# Patient Record
Sex: FEMALE | Race: BLACK OR AFRICAN AMERICAN | NOT HISPANIC OR LATINO | URBAN - METROPOLITAN AREA
[De-identification: names, ages, dates, MRNs, and addresses within clinical notes are randomized per-mention and may not be internally consistent; named-entity substitution may affect disease eponyms.]

---

## 2018-12-28 ENCOUNTER — EMERGENCY (EMERGENCY)
Facility: HOSPITAL | Age: 18
LOS: 0 days | Discharge: HOME | End: 2018-12-28
Attending: EMERGENCY MEDICINE
Payer: MEDICAID

## 2018-12-28 VITALS
TEMPERATURE: 98 F | SYSTOLIC BLOOD PRESSURE: 137 MMHG | OXYGEN SATURATION: 98 % | RESPIRATION RATE: 17 BRPM | HEART RATE: 73 BPM | WEIGHT: 190.04 LBS | DIASTOLIC BLOOD PRESSURE: 60 MMHG

## 2018-12-28 VITALS
TEMPERATURE: 98 F | HEART RATE: 80 BPM | DIASTOLIC BLOOD PRESSURE: 70 MMHG | RESPIRATION RATE: 18 BRPM | OXYGEN SATURATION: 98 % | SYSTOLIC BLOOD PRESSURE: 128 MMHG

## 2018-12-28 DIAGNOSIS — N76.4 ABSCESS OF VULVA: ICD-10-CM

## 2018-12-28 LAB
APPEARANCE UR: ABNORMAL
BACTERIA # UR AUTO: ABNORMAL
BILIRUB UR-MCNC: NEGATIVE — SIGNIFICANT CHANGE UP
COLOR SPEC: YELLOW — SIGNIFICANT CHANGE UP
DIFF PNL FLD: NEGATIVE — SIGNIFICANT CHANGE UP
EPI CELLS # UR: SIGNIFICANT CHANGE UP
GLUCOSE UR QL: NEGATIVE MG/DL — SIGNIFICANT CHANGE UP
HCG UR QL: NEGATIVE — SIGNIFICANT CHANGE UP
KETONES UR-MCNC: ABNORMAL
LEUKOCYTE ESTERASE UR-ACNC: ABNORMAL
NITRITE UR-MCNC: POSITIVE
PH UR: 7 — SIGNIFICANT CHANGE UP (ref 5–8)
PROT UR-MCNC: 15 MG/DL
RBC CASTS # UR COMP ASSIST: SIGNIFICANT CHANGE UP /HPF (ref 0–4)
SP GR SPEC: 1.01 — SIGNIFICANT CHANGE UP (ref 1.01–1.02)
UROBILINOGEN FLD QL: NEGATIVE MG/DL — SIGNIFICANT CHANGE UP
WBC UR QL: SIGNIFICANT CHANGE UP

## 2018-12-28 PROCEDURE — 56405 I&D VULVA/PERINEAL ABSCESS: CPT

## 2018-12-28 PROCEDURE — 99284 EMERGENCY DEPT VISIT MOD MDM: CPT | Mod: 25

## 2018-12-28 NOTE — ED PROVIDER NOTE - OBJECTIVE STATEMENT
This is  a 19 yo F c/o or right genital abscess s/p shaving x 3 days. Denies n/v/f/d/ vaginal discharge

## 2018-12-28 NOTE — ED PROVIDER NOTE - ATTENDING CONTRIBUTION TO CARE
Patient evaluated and seen with CHIQUIS Anaya agree with above history and physical - pt examined and seen by me personally - findings as seen: Pt with R vaginal lip swelling - abscess noted - drained purulent discharge by CHIQUIS Anaya - placed on Abx and given GYN follow up.

## 2018-12-28 NOTE — ED ADULT NURSE NOTE - OBJECTIVE STATEMENT
a/o x4 c/o 10/10 painful cyst to right vaginal labia x 2days, states its swollen and red with a "head"

## 2018-12-29 LAB
C TRACH RRNA SPEC QL NAA+PROBE: SIGNIFICANT CHANGE UP
N GONORRHOEA RRNA SPEC QL NAA+PROBE: SIGNIFICANT CHANGE UP
SPECIMEN SOURCE: SIGNIFICANT CHANGE UP

## 2018-12-30 LAB
-  AMIKACIN: SIGNIFICANT CHANGE UP
-  AMOXICILLIN/CLAVULANIC ACID: SIGNIFICANT CHANGE UP
-  AMPICILLIN/SULBACTAM: SIGNIFICANT CHANGE UP
-  AMPICILLIN/SULBACTAM: SIGNIFICANT CHANGE UP
-  AMPICILLIN: SIGNIFICANT CHANGE UP
-  AZTREONAM: SIGNIFICANT CHANGE UP
-  CEFAZOLIN: SIGNIFICANT CHANGE UP
-  CEFAZOLIN: SIGNIFICANT CHANGE UP
-  CEFEPIME: SIGNIFICANT CHANGE UP
-  CEFOXITIN: SIGNIFICANT CHANGE UP
-  CEFTRIAXONE: SIGNIFICANT CHANGE UP
-  CIPROFLOXACIN: SIGNIFICANT CHANGE UP
-  CLINDAMYCIN: SIGNIFICANT CHANGE UP
-  ERTAPENEM: SIGNIFICANT CHANGE UP
-  ERYTHROMYCIN: SIGNIFICANT CHANGE UP
-  GENTAMICIN: SIGNIFICANT CHANGE UP
-  GENTAMICIN: SIGNIFICANT CHANGE UP
-  IMIPENEM: SIGNIFICANT CHANGE UP
-  LEVOFLOXACIN: SIGNIFICANT CHANGE UP
-  MEROPENEM: SIGNIFICANT CHANGE UP
-  NITROFURANTOIN: SIGNIFICANT CHANGE UP
-  OXACILLIN: SIGNIFICANT CHANGE UP
-  PIPERACILLIN/TAZOBACTAM: SIGNIFICANT CHANGE UP
-  RIFAMPIN: SIGNIFICANT CHANGE UP
-  TETRACYCLINE: SIGNIFICANT CHANGE UP
-  TIGECYCLINE: SIGNIFICANT CHANGE UP
-  TOBRAMYCIN: SIGNIFICANT CHANGE UP
-  TRIMETHOPRIM/SULFAMETHOXAZOLE: SIGNIFICANT CHANGE UP
-  TRIMETHOPRIM/SULFAMETHOXAZOLE: SIGNIFICANT CHANGE UP
-  VANCOMYCIN: SIGNIFICANT CHANGE UP
CULTURE RESULTS: SIGNIFICANT CHANGE UP
METHOD TYPE: SIGNIFICANT CHANGE UP
METHOD TYPE: SIGNIFICANT CHANGE UP
ORGANISM # SPEC MICROSCOPIC CNT: SIGNIFICANT CHANGE UP
ORGANISM # SPEC MICROSCOPIC CNT: SIGNIFICANT CHANGE UP
SPECIMEN SOURCE: SIGNIFICANT CHANGE UP

## 2019-01-01 LAB
CULTURE RESULTS: SIGNIFICANT CHANGE UP
ORGANISM # SPEC MICROSCOPIC CNT: SIGNIFICANT CHANGE UP
ORGANISM # SPEC MICROSCOPIC CNT: SIGNIFICANT CHANGE UP
SPECIMEN SOURCE: SIGNIFICANT CHANGE UP

## 2019-09-17 ENCOUNTER — EMERGENCY (EMERGENCY)
Facility: HOSPITAL | Age: 19
LOS: 0 days | Discharge: HOME | End: 2019-09-17
Payer: MEDICAID

## 2019-09-17 VITALS
WEIGHT: 190.04 LBS | DIASTOLIC BLOOD PRESSURE: 65 MMHG | HEART RATE: 77 BPM | HEIGHT: 64 IN | TEMPERATURE: 98 F | RESPIRATION RATE: 18 BRPM | OXYGEN SATURATION: 99 % | SYSTOLIC BLOOD PRESSURE: 129 MMHG

## 2019-09-17 VITALS
SYSTOLIC BLOOD PRESSURE: 120 MMHG | TEMPERATURE: 98 F | OXYGEN SATURATION: 99 % | DIASTOLIC BLOOD PRESSURE: 60 MMHG | HEART RATE: 76 BPM | RESPIRATION RATE: 16 BRPM

## 2019-09-17 DIAGNOSIS — R11.2 NAUSEA WITH VOMITING, UNSPECIFIED: ICD-10-CM

## 2019-09-17 DIAGNOSIS — O20.0 THREATENED ABORTION: ICD-10-CM

## 2019-09-17 DIAGNOSIS — R10.2 PELVIC AND PERINEAL PAIN: ICD-10-CM

## 2019-09-17 LAB
ALBUMIN SERPL ELPH-MCNC: 3.2 G/DL — LOW (ref 3.3–5)
ALP SERPL-CCNC: 70 U/L — SIGNIFICANT CHANGE UP (ref 40–120)
ALT FLD-CCNC: 15 U/L — SIGNIFICANT CHANGE UP (ref 12–78)
ANION GAP SERPL CALC-SCNC: 9 MMOL/L — SIGNIFICANT CHANGE UP (ref 5–17)
APPEARANCE UR: CLEAR — SIGNIFICANT CHANGE UP
APTT BLD: 28.3 SEC — SIGNIFICANT CHANGE UP (ref 27.5–36.3)
AST SERPL-CCNC: 13 U/L — LOW (ref 15–37)
BASOPHILS # BLD AUTO: 0.04 K/UL — SIGNIFICANT CHANGE UP (ref 0–0.2)
BASOPHILS NFR BLD AUTO: 0.3 % — SIGNIFICANT CHANGE UP (ref 0–2)
BILIRUB SERPL-MCNC: 0.2 MG/DL — SIGNIFICANT CHANGE UP (ref 0.2–1.2)
BILIRUB UR-MCNC: NEGATIVE — SIGNIFICANT CHANGE UP
BUN SERPL-MCNC: 8 MG/DL — SIGNIFICANT CHANGE UP (ref 7–23)
CALCIUM SERPL-MCNC: 8.3 MG/DL — LOW (ref 8.5–10.1)
CHLORIDE SERPL-SCNC: 107 MMOL/L — SIGNIFICANT CHANGE UP (ref 96–108)
CO2 SERPL-SCNC: 24 MMOL/L — SIGNIFICANT CHANGE UP (ref 22–31)
COLOR SPEC: YELLOW — SIGNIFICANT CHANGE UP
CREAT SERPL-MCNC: 0.75 MG/DL — SIGNIFICANT CHANGE UP (ref 0.5–1.3)
DIFF PNL FLD: NEGATIVE — SIGNIFICANT CHANGE UP
EOSINOPHIL # BLD AUTO: 0.19 K/UL — SIGNIFICANT CHANGE UP (ref 0–0.5)
EOSINOPHIL NFR BLD AUTO: 1.3 % — SIGNIFICANT CHANGE UP (ref 0–6)
GLUCOSE SERPL-MCNC: 66 MG/DL — LOW (ref 70–99)
GLUCOSE UR QL: NEGATIVE MG/DL — SIGNIFICANT CHANGE UP
HCG SERPL-ACNC: HIGH MIU/ML
HCG UR QL: POSITIVE
HCT VFR BLD CALC: 38.7 % — SIGNIFICANT CHANGE UP (ref 34.5–45)
HGB BLD-MCNC: 12.8 G/DL — SIGNIFICANT CHANGE UP (ref 11.5–15.5)
IMM GRANULOCYTES NFR BLD AUTO: 0.4 % — SIGNIFICANT CHANGE UP (ref 0–1.5)
INR BLD: 1.07 RATIO — SIGNIFICANT CHANGE UP (ref 0.88–1.16)
KETONES UR-MCNC: NEGATIVE — SIGNIFICANT CHANGE UP
LEUKOCYTE ESTERASE UR-ACNC: NEGATIVE — SIGNIFICANT CHANGE UP
LYMPHOCYTES # BLD AUTO: 29 % — SIGNIFICANT CHANGE UP (ref 13–44)
LYMPHOCYTES # BLD AUTO: 4.2 K/UL — HIGH (ref 1–3.3)
MCHC RBC-ENTMCNC: 28.2 PG — SIGNIFICANT CHANGE UP (ref 27–34)
MCHC RBC-ENTMCNC: 33.1 GM/DL — SIGNIFICANT CHANGE UP (ref 32–36)
MCV RBC AUTO: 85.2 FL — SIGNIFICANT CHANGE UP (ref 80–100)
MONOCYTES # BLD AUTO: 0.65 K/UL — SIGNIFICANT CHANGE UP (ref 0–0.9)
MONOCYTES NFR BLD AUTO: 4.5 % — SIGNIFICANT CHANGE UP (ref 2–14)
NEUTROPHILS # BLD AUTO: 9.33 K/UL — HIGH (ref 1.8–7.4)
NEUTROPHILS NFR BLD AUTO: 64.5 % — SIGNIFICANT CHANGE UP (ref 43–77)
NITRITE UR-MCNC: NEGATIVE — SIGNIFICANT CHANGE UP
NRBC # BLD: 0 /100 WBCS — SIGNIFICANT CHANGE UP (ref 0–0)
PH UR: 5 — SIGNIFICANT CHANGE UP (ref 5–8)
PLATELET # BLD AUTO: 332 K/UL — SIGNIFICANT CHANGE UP (ref 150–400)
POTASSIUM SERPL-MCNC: 3.7 MMOL/L — SIGNIFICANT CHANGE UP (ref 3.5–5.3)
POTASSIUM SERPL-SCNC: 3.7 MMOL/L — SIGNIFICANT CHANGE UP (ref 3.5–5.3)
PROT SERPL-MCNC: 7.4 GM/DL — SIGNIFICANT CHANGE UP (ref 6–8.3)
PROT UR-MCNC: NEGATIVE MG/DL — SIGNIFICANT CHANGE UP
PROTHROM AB SERPL-ACNC: 12 SEC — SIGNIFICANT CHANGE UP (ref 10–12.9)
RBC # BLD: 4.54 M/UL — SIGNIFICANT CHANGE UP (ref 3.8–5.2)
RBC # FLD: 15.8 % — HIGH (ref 10.3–14.5)
SODIUM SERPL-SCNC: 140 MMOL/L — SIGNIFICANT CHANGE UP (ref 135–145)
SP GR SPEC: 1.02 — SIGNIFICANT CHANGE UP (ref 1.01–1.02)
UROBILINOGEN FLD QL: NEGATIVE MG/DL — SIGNIFICANT CHANGE UP
WBC # BLD: 14.47 K/UL — HIGH (ref 3.8–10.5)
WBC # FLD AUTO: 14.47 K/UL — HIGH (ref 3.8–10.5)

## 2019-09-17 PROCEDURE — 99284 EMERGENCY DEPT VISIT MOD MDM: CPT

## 2019-09-17 PROCEDURE — 76856 US EXAM PELVIC COMPLETE: CPT | Mod: 26

## 2019-09-17 RX ORDER — SODIUM CHLORIDE 9 MG/ML
1000 INJECTION INTRAMUSCULAR; INTRAVENOUS; SUBCUTANEOUS ONCE
Refills: 0 | Status: COMPLETED | OUTPATIENT
Start: 2019-09-17 | End: 2019-09-17

## 2019-09-17 RX ADMIN — SODIUM CHLORIDE 1000 MILLILITER(S): 9 INJECTION INTRAMUSCULAR; INTRAVENOUS; SUBCUTANEOUS at 16:51

## 2019-09-17 RX ADMIN — SODIUM CHLORIDE 2000 MILLILITER(S): 9 INJECTION INTRAMUSCULAR; INTRAVENOUS; SUBCUTANEOUS at 14:10

## 2019-09-17 NOTE — ED ADULT NURSE NOTE - PMH
Miscarriage    No pertinent past medical history     Miscarriage    No pertinent past medical history

## 2019-09-17 NOTE — ED ADULT NURSE NOTE - NSIMPLEMENTINTERV_GEN_ALL_ED
Implemented All Fall with Harm Risk Interventions:  [place] to call system. Call bell, personal items and telephone within reach. Instruct patient to call for assistance. Room bathroom lighting operational. Non-slip footwear when patient is off stretcher. Physically safe environment: no spills, clutter or unnecessary equipment. Stretcher in lowest position, wheels locked, appropriate side rails in place. Provide visual cue, wrist band, yellow gown, etc. Monitor gait and stability. Monitor for mental status changes and reorient to person, place, and time. Review medications for side effects contributing to fall risk. Reinforce activity limits and safety measures with patient and family. Provide visual clues: red socks.

## 2019-09-17 NOTE — ED PROVIDER NOTE - PHYSICAL EXAMINATION
Gen: Alert, NAD, well appearing, not toxic  Head: NC, AT, PERRL, EOMI, normal lids/conjunctiva  ENT: B TM WNL, normal hearing, patent oropharynx without erythema/exudate, uvula midline  Neck: +supple, no tenderness/meningismus/JVD, +Trachea midline  Pulm: Bilateral BS, normal resp effort, no wheeze/stridor/retractions  CV: RRR, no M/R/G, +dist pulses  Abd: soft, +pelvic tenderness, no guarding, no cvat ND, +BS, no hepatosplenomegaly  Mskel: no edema/erythema/cyanosis str 5/5 x4, sensations intact, gait ok  Skin: no rash  Neuro: AAOx3, no sensory/motor deficits, CN 2-12 intact

## 2019-09-17 NOTE — ED PROVIDER NOTE - OBJECTIVE STATEMENT
20 y/o female  with no PMH here c/o pelvic pain radiating to lower back x 2 weeks. pt also reports nausea and morning vomiting. LMP . reports pain as, intermittent, crampy feeling without any aggravating or alleviating factors. denies recent surgeries. no bowel or bladder incontinence. no ivda. no fevers. no change in sensation or change in gait. no vag bleeding or discharge. no hx kidney stones. no urinary sx.     ROS: No fever/chills. No eye pain/changes in vision, No ear pain/sore throat/dysphagia, No chest pain/palpitations. No SOB/cough/. +abdominal pain, N/V/D, no black/bloody bm. No dysuria/frequency/discharge, No headache. No Dizziness.    No rashes or breaks in skin. No numbness/tingling/weakness.

## 2019-09-17 NOTE — ED ADULT TRIAGE NOTE - CHIEF COMPLAINT QUOTE
pt complaining of back pain radiating to abdomen, nausea, diarrhea with intermittent constipation times 2 weeks. denies any medical history

## 2019-09-17 NOTE — ED PROVIDER NOTE - CLINICAL SUMMARY MEDICAL DECISION MAKING FREE TEXT BOX
pt here with pelvic pain and n/v, lmp in july, will check labs, urine, hcg, ivf, sono, pain control, reassess

## 2019-09-17 NOTE — ED PROVIDER NOTE - PATIENT PORTAL LINK FT
You can access the FollowMyHealth Patient Portal offered by [facility] by registering at the following website: [email or web address]. By joining [facility]’s FollowMyHealth portal, you will also be able to view your health information using other applications (apps) compatible with our system.

## 2019-09-18 LAB
CULTURE RESULTS: SIGNIFICANT CHANGE UP
SPECIMEN SOURCE: SIGNIFICANT CHANGE UP

## 2020-01-01 ENCOUNTER — OUTPATIENT (OUTPATIENT)
Dept: OUTPATIENT SERVICES | Facility: HOSPITAL | Age: 20
LOS: 1 days | End: 2020-01-01
Payer: MEDICAID

## 2020-01-01 PROCEDURE — G9001: CPT

## 2020-01-04 ENCOUNTER — INPATIENT (INPATIENT)
Facility: HOSPITAL | Age: 20
LOS: 10 days | Discharge: HOME | End: 2020-01-15
Attending: PSYCHIATRY & NEUROLOGY | Admitting: PSYCHIATRY & NEUROLOGY
Payer: MEDICAID

## 2020-01-04 VITALS
HEART RATE: 83 BPM | DIASTOLIC BLOOD PRESSURE: 78 MMHG | OXYGEN SATURATION: 99 % | SYSTOLIC BLOOD PRESSURE: 109 MMHG | RESPIRATION RATE: 16 BRPM | TEMPERATURE: 99 F

## 2020-01-04 DIAGNOSIS — R69 ILLNESS, UNSPECIFIED: ICD-10-CM

## 2020-01-04 DIAGNOSIS — F32.1 MAJOR DEPRESSIVE DISORDER, SINGLE EPISODE, MODERATE: ICD-10-CM

## 2020-01-04 PROCEDURE — 99285 EMERGENCY DEPT VISIT HI MDM: CPT

## 2020-01-04 NOTE — ED PROVIDER NOTE - OBJECTIVE STATEMENT
20 y/o F BIBA  w c/o depression and suicidal ideations secondary to breaking up with her boyfriend . EMS reported that patient expressed suicidal ideations and   was found standing near a pond .  Upon arrival in ER patient  denies SI/HI/AH/VH. Denies falling,  punching, or kicking any objects.   Denies pain, SOB, fever, chills,  chest/abdominal discomfort. Denies use of  alcohol or illicit drugs  .  No evidence of physical injuries, broken  skin or deformities.

## 2020-01-04 NOTE — ED BEHAVIORAL HEALTH ASSESSMENT NOTE - AXIS IV
Problems with primary support/Occupational problems Problems with primary support/Problem related to social environment/Occupational problems
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
## 2020-01-04 NOTE — ED BEHAVIORAL HEALTH ASSESSMENT NOTE - NS ED BHA PLAN REASON FOR OVERRIDING OBJECTION FT
based on symptoms and prior behavior it's not safe to dc her. Collateral information was not meaningful or helpful to ensure a safe dc.

## 2020-01-04 NOTE — ED BEHAVIORAL HEALTH ASSESSMENT NOTE - ACTIVATING EVENTS/STRESSORS
Other
Triggering events leading to humiliation, shame, and/or despair (e.g. Loss of relationship, financial or health status) (real or anticipated)/Inadequate social supports

## 2020-01-04 NOTE — ED PROVIDER NOTE - CARE PLAN
Principal Discharge DX:	Adjustment disorder Principal Discharge DX:	Adjustment disorder  Secondary Diagnosis:	Pregnancy headache, first trimester  Secondary Diagnosis:	Nausea

## 2020-01-04 NOTE — ED BEHAVIORAL HEALTH ASSESSMENT NOTE - PRIMARY DX
Current moderate episode of major depressive disorder, unspecified whether recurrent Axis II diagnosis deferred

## 2020-01-04 NOTE — ED BEHAVIORAL HEALTH ASSESSMENT NOTE - HPI (INCLUDE ILLNESS QUALITY, SEVERITY, DURATION, TIMING, CONTEXT, MODIFYING FACTORS, ASSOCIATED SIGNS AND SYMPTOMS)
Patient is a 18 yo F, domiciled with boyfriend, BIBEMS activated by boyfriend for suicidal statements. Patient reports depression and SI after breaking up with her boyfriend.     Left message for patient's boyfriend, Rafat with whom she lives ([phone]). No answer, voicemail left requesting callback for collateral.

## 2020-01-04 NOTE — ED BEHAVIORAL HEALTH ASSESSMENT NOTE - RISK ASSESSMENT
Moderate Acute Suicide Risk Nikki is at moderate suicide risks considering her depression, trauma hx, not in treatment, impulsive, and two suicide attempts. Protective factors: future oriented and stable relationship w/ boyfriend.

## 2020-01-04 NOTE — ED PROVIDER NOTE - CLINICAL SUMMARY MEDICAL DECISION MAKING FREE TEXT BOX
20 y/o F   Medical evaluation performed. There is no clinical evidence of intoxication or any acute medical problem requiring immediate intervention. Patient is awaiting psychiatric consultation. Final disposition will be determined by psychiatrist.

## 2020-01-04 NOTE — ED BEHAVIORAL HEALTH ASSESSMENT NOTE - SUMMARY
Nikki is a 20yo black woman w/ no previous psych diagnosis, who was BIBEMS tonight activated by boyfriend due to suicidal ideation w/ plan to drown herself. It seems that Nikki and her boyfriend are minimizing what happened tonight and based on information provided there's concern for her safety if dced to the community, especially without follow up care. Nikki has h/o 2 suicide attempts, is depressed, not engaged in treatment, has limited social support, unemployed, and poor insight into her condition. She is a danger to self and requires inpatient treatment at this time.

## 2020-01-04 NOTE — ED BEHAVIORAL HEALTH ASSESSMENT NOTE - OTHER
boyfriend lives w/ boyfriend in [place] isolative and conflict w/ boyfriend no bed availability. deferred until bed availability is known (9.39 vs 9.27) [id number]

## 2020-01-04 NOTE — ED ADULT TRIAGE NOTE - CHIEF COMPLAINT QUOTE
A&OX3 brougth in by ems and nypd for suicidal ideation, pt states " I broke up with my boyfriend" as per boyfriend pt was found near a pond, denies si/hi at this time pt sent to  spoke to BOB Nelson

## 2020-01-04 NOTE — ED BEHAVIORAL HEALTH ASSESSMENT NOTE - DESCRIPTION
Patient calm and cooperative.    Vital Signs Last 24 Hrs  T(C): 37 (04 Jan 2020 19:53), Max: 37 (04 Jan 2020 19:53)  T(F): 98.6 (04 Jan 2020 19:53), Max: 98.6 (04 Jan 2020 19:53)  HR: 83 (04 Jan 2020 19:53) (83 - 83)  BP: 109/78 (04 Jan 2020 19:53) (109/78 - 109/78)  RR: 16 (04 Jan 2020 19:53) (16 - 16)  SpO2: 99% (04 Jan 2020 19:53) (99% - 99%) Lives with boyfriend.

## 2020-01-04 NOTE — ED BEHAVIORAL HEALTH ASSESSMENT NOTE - DESCRIPTION
calm and cooperative     Vital Signs Last 24 Hrs  T(C): 37 (04 Jan 2020 19:53), Max: 37 (04 Jan 2020 19:53)  T(F): 98.6 (04 Jan 2020 19:53), Max: 98.6 (04 Jan 2020 19:53)  HR: 83 (04 Jan 2020 19:53) (83 - 83)  BP: 109/78 (04 Jan 2020 19:53) (109/78 - 109/78)  BP(mean): --  RR: 16 (04 Jan 2020 19:53) (16 - 16)  SpO2: 99% (04 Jan 2020 19:53) (99% - 99%) denied. lives w/ boyfriend in [place] for the apst 2,5 years. 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 Boyfriend is supporting her financially. lives w/ boyfriend in [place] for the past 2,5 years. 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 Boyfriend is supporting her financially. She completed HS.
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
## 2020-01-04 NOTE — ED PROVIDER NOTE - PROGRESS NOTE DETAILS
Patient not signed out to me boarding in  for 30 hours: Notified by RN pt experiencing nausea. Pt found to be pregnant on labs. Pt states nausea improved after Zofran. No abdominal pain. No vaginal bleeding. Pt has not had pre-wojciech care. Pt found out she was pregnant in ER. . LN 19. Will send for TV US in order to stage for facilitating f/u. EDI. Pt should be on pre- vitamins and Zofran PRN for nausea. f/u Ob/Gyn upon discharge. SANDER Moyer NP Sign out received from previous ED provider.  Imaging and labs reviewed, patient not in distress, nontoxic appearing and medically stable for inpatient psychiatric admission per the recommendation of ED psychiatry.

## 2020-01-04 NOTE — ED BEHAVIORAL HEALTH ASSESSMENT NOTE - VIOLENCE PROTECTIVE FACTORS:
Residential stability/Relationship stability
Residential stability/Relationship stability/Sobriety/Insight into violence risk and need for management/treatment

## 2020-01-05 LAB
ALBUMIN SERPL ELPH-MCNC: 4.4 G/DL — SIGNIFICANT CHANGE UP (ref 3.3–5)
ALP SERPL-CCNC: 69 U/L — SIGNIFICANT CHANGE UP (ref 40–120)
ALT FLD-CCNC: 10 U/L — SIGNIFICANT CHANGE UP (ref 4–33)
AMPHET UR-MCNC: NEGATIVE — SIGNIFICANT CHANGE UP
ANION GAP SERPL CALC-SCNC: 15 MMO/L — HIGH (ref 7–14)
APAP SERPL-MCNC: < 15 UG/ML — LOW (ref 15–25)
APPEARANCE UR: SIGNIFICANT CHANGE UP
AST SERPL-CCNC: 19 U/L — SIGNIFICANT CHANGE UP (ref 4–32)
BACTERIA # UR AUTO: SIGNIFICANT CHANGE UP
BARBITURATES UR SCN-MCNC: NEGATIVE — SIGNIFICANT CHANGE UP
BASOPHILS # BLD AUTO: 0.02 K/UL — SIGNIFICANT CHANGE UP (ref 0–0.2)
BASOPHILS NFR BLD AUTO: 0.2 % — SIGNIFICANT CHANGE UP (ref 0–2)
BENZODIAZ UR-MCNC: NEGATIVE — SIGNIFICANT CHANGE UP
BILIRUB SERPL-MCNC: 0.3 MG/DL — SIGNIFICANT CHANGE UP (ref 0.2–1.2)
BILIRUB UR-MCNC: NEGATIVE — SIGNIFICANT CHANGE UP
BLOOD UR QL VISUAL: NEGATIVE — SIGNIFICANT CHANGE UP
BUN SERPL-MCNC: 9 MG/DL — SIGNIFICANT CHANGE UP (ref 7–23)
CALCIUM SERPL-MCNC: 9.6 MG/DL — SIGNIFICANT CHANGE UP (ref 8.4–10.5)
CANNABINOIDS UR-MCNC: POSITIVE — SIGNIFICANT CHANGE UP
CHLORIDE SERPL-SCNC: 99 MMOL/L — SIGNIFICANT CHANGE UP (ref 98–107)
CO2 SERPL-SCNC: 20 MMOL/L — LOW (ref 22–31)
COCAINE METAB.OTHER UR-MCNC: NEGATIVE — SIGNIFICANT CHANGE UP
COLOR SPEC: YELLOW — SIGNIFICANT CHANGE UP
CREAT SERPL-MCNC: 0.75 MG/DL — SIGNIFICANT CHANGE UP (ref 0.5–1.3)
EOSINOPHIL # BLD AUTO: 0.04 K/UL — SIGNIFICANT CHANGE UP (ref 0–0.5)
EOSINOPHIL NFR BLD AUTO: 0.3 % — SIGNIFICANT CHANGE UP (ref 0–6)
ETHANOL BLD-MCNC: < 10 MG/DL — SIGNIFICANT CHANGE UP
GLUCOSE SERPL-MCNC: 85 MG/DL — SIGNIFICANT CHANGE UP (ref 70–99)
GLUCOSE UR-MCNC: NEGATIVE — SIGNIFICANT CHANGE UP
HCG SERPL-ACNC: SIGNIFICANT CHANGE UP MIU/ML
HCT VFR BLD CALC: 41.7 % — SIGNIFICANT CHANGE UP (ref 34.5–45)
HGB BLD-MCNC: 13.7 G/DL — SIGNIFICANT CHANGE UP (ref 11.5–15.5)
HYALINE CASTS # UR AUTO: SIGNIFICANT CHANGE UP
IMM GRANULOCYTES NFR BLD AUTO: 0.3 % — SIGNIFICANT CHANGE UP (ref 0–1.5)
KETONES UR-MCNC: HIGH
LEUKOCYTE ESTERASE UR-ACNC: SIGNIFICANT CHANGE UP
LYMPHOCYTES # BLD AUTO: 18 % — SIGNIFICANT CHANGE UP (ref 13–44)
LYMPHOCYTES # BLD AUTO: 2.19 K/UL — SIGNIFICANT CHANGE UP (ref 1–3.3)
MCHC RBC-ENTMCNC: 28.4 PG — SIGNIFICANT CHANGE UP (ref 27–34)
MCHC RBC-ENTMCNC: 32.9 % — SIGNIFICANT CHANGE UP (ref 32–36)
MCV RBC AUTO: 86.3 FL — SIGNIFICANT CHANGE UP (ref 80–100)
METHADONE UR-MCNC: NEGATIVE — SIGNIFICANT CHANGE UP
MONOCYTES # BLD AUTO: 0.76 K/UL — SIGNIFICANT CHANGE UP (ref 0–0.9)
MONOCYTES NFR BLD AUTO: 6.3 % — SIGNIFICANT CHANGE UP (ref 2–14)
NEUTROPHILS # BLD AUTO: 9.1 K/UL — HIGH (ref 1.8–7.4)
NEUTROPHILS NFR BLD AUTO: 74.9 % — SIGNIFICANT CHANGE UP (ref 43–77)
NITRITE UR-MCNC: NEGATIVE — SIGNIFICANT CHANGE UP
NRBC # FLD: 0 K/UL — SIGNIFICANT CHANGE UP (ref 0–0)
OPIATES UR-MCNC: NEGATIVE — SIGNIFICANT CHANGE UP
OXYCODONE UR-MCNC: NEGATIVE — SIGNIFICANT CHANGE UP
PCP UR-MCNC: NEGATIVE — SIGNIFICANT CHANGE UP
PH UR: 6 — SIGNIFICANT CHANGE UP (ref 5–8)
PLATELET # BLD AUTO: 307 K/UL — SIGNIFICANT CHANGE UP (ref 150–400)
PMV BLD: 10.9 FL — SIGNIFICANT CHANGE UP (ref 7–13)
POTASSIUM SERPL-MCNC: 3.6 MMOL/L — SIGNIFICANT CHANGE UP (ref 3.5–5.3)
POTASSIUM SERPL-SCNC: 3.6 MMOL/L — SIGNIFICANT CHANGE UP (ref 3.5–5.3)
PROT SERPL-MCNC: 7.9 G/DL — SIGNIFICANT CHANGE UP (ref 6–8.3)
PROT UR-MCNC: 70 — SIGNIFICANT CHANGE UP
RBC # BLD: 4.83 M/UL — SIGNIFICANT CHANGE UP (ref 3.8–5.2)
RBC # FLD: 14.6 % — HIGH (ref 10.3–14.5)
RBC CASTS # UR COMP ASSIST: SIGNIFICANT CHANGE UP (ref 0–?)
SALICYLATES SERPL-MCNC: < 5 MG/DL — LOW (ref 15–30)
SODIUM SERPL-SCNC: 134 MMOL/L — LOW (ref 135–145)
SP GR SPEC: 1.03 — SIGNIFICANT CHANGE UP (ref 1–1.04)
SQUAMOUS # UR AUTO: SIGNIFICANT CHANGE UP
TSH SERPL-MCNC: 0.58 UIU/ML — SIGNIFICANT CHANGE UP (ref 0.27–4.2)
UROBILINOGEN FLD QL: NORMAL — SIGNIFICANT CHANGE UP
WBC # BLD: 12.15 K/UL — HIGH (ref 3.8–10.5)
WBC # FLD AUTO: 12.15 K/UL — HIGH (ref 3.8–10.5)
WBC UR QL: HIGH (ref 0–?)

## 2020-01-05 RX ORDER — MIRTAZAPINE 45 MG/1
15 TABLET, ORALLY DISINTEGRATING ORAL AT BEDTIME
Refills: 0 | Status: DISCONTINUED | OUTPATIENT
Start: 2020-01-05 | End: 2020-01-05
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
## 2020-01-05 NOTE — ED ADULT NURSE REASSESSMENT NOTE - COMFORT CARE
darkened lights/meal provided/plan of care explained/po fluids offered/warm blanket provided
po fluids offered

## 2020-01-05 NOTE — ED BEHAVIORAL HEALTH NOTE - BEHAVIORAL HEALTH NOTE
The following hospitals were contacted in an effort to possibly transfer Pt for in-patient psych admission:  [place] [id number] () - the unit is currently not accepting patients as there is a Noravirus outbreak  [facility] () - spoken to Dr MANA Acosta (ED attending) who informs that currently, no beds available  [facility] () - was informed that there are no beds available  [facility] () -  was informed that there are no beds available

## 2020-01-05 NOTE — ED BEHAVIORAL HEALTH NOTE - BEHAVIORAL HEALTH NOTE
ART attempted to contact multiple hospitals to find inpatient bed for patient.   ART contacted [facility] [place] [phone] and was informed by Issa there are no beds available at any [facility].  ART contacted [place] and was informed by David that there are no beds available.  ART contacted [facility], main location, and spoke with Dr. Galarza in the Psych ED. SW was informed that there are only M beds available.   ART contacted [facility] [phone] and was informed there are no beds available.   ART contacted [facility] [phone] and was informed they are not able to accept on weekends.   ART contacted [facility] [phone] and was informed they are not able to accept admissions today.   ART contacted [facility] [phone], no available beds. ART attempted to contact multiple hospitals to find inpatient bed for patient.   ART contacted [facility] [phone] and was informed by Issa there are no beds available at any [facility].  ART contacted [place] [phone] and was informed by David that there are no beds available.  ART contacted [facility], main location [phone], and spoke with Dr. Galarza in the Psych ED. SW was informed that there are only M beds available.   ART contacted [facility] [phone] and was informed there are no beds available.   ART contacted [facility] [phone] and was informed they are not able to accept on weekends.   ART contacted [facility] [phone] and was informed they are not able to accept admissions today.   ART contacted [facility] [phone], no available beds.

## 2020-01-06 DIAGNOSIS — F33.9 MAJOR DEPRESSIVE DISORDER, RECURRENT, UNSPECIFIED: ICD-10-CM

## 2020-01-06 PROCEDURE — 76817 TRANSVAGINAL US OBSTETRIC: CPT | Mod: 26

## 2020-01-06 RX ORDER — ONDANSETRON 8 MG/1
4 TABLET, FILM COATED ORAL EVERY 8 HOURS
Refills: 0 | Status: DISCONTINUED | OUTPATIENT
Start: 2020-01-06 | End: 2020-01-15

## 2020-01-06 RX ORDER — ONDANSETRON 8 MG/1
4 TABLET, FILM COATED ORAL ONCE
Refills: 0 | Status: COMPLETED | OUTPATIENT
Start: 2020-01-06 | End: 2020-01-06

## 2020-01-06 RX ORDER — DIPHENHYDRAMINE HCL 50 MG
50 CAPSULE ORAL ONCE
Refills: 0 | Status: DISCONTINUED | OUTPATIENT
Start: 2020-01-06 | End: 2020-01-07

## 2020-01-06 RX ADMIN — ONDANSETRON 4 MILLIGRAM(S): 8 TABLET, FILM COATED ORAL at 02:50

## 2020-01-06 NOTE — ED BEHAVIORAL HEALTH NOTE - BEHAVIORAL HEALTH NOTE
patient seen, chart reviewed. Patient continues to endorse depressive symptoms and is interested in treatment. Patient has been calm and cooperative, no current report of nausea. Patient was happy to know the ultrasound revealed a viable pregnancy.     Bed obtained on 2W. 939 completed as pt is an imminent threat of harm to self even though help seeking. Handoff provided to Dr. Johnston. Recommend security transport. No 1:1 required in locked supervised setting.

## 2020-01-06 NOTE — ED ADULT NURSE REASSESSMENT NOTE - NS ED NURSE REASSESS COMMENT FT1
receiving pt from night RN. pt sleeping, but able to arouse. pt breathing even and non labored. belongings checked and stored for safety reasons.
Pt woke up to use bathroom, reassessment vitals as noted. Pt offered PO fluids and accepted. Pt awaiting inpatient psychiatric admission bed.

## 2020-01-07 DIAGNOSIS — Z71.89 OTHER SPECIFIED COUNSELING: ICD-10-CM

## 2020-01-07 PROBLEM — O03.9 COMPLETE OR UNSPECIFIED SPONTANEOUS ABORTION WITHOUT COMPLICATION: Chronic | Status: ACTIVE | Noted: 2019-09-17

## 2020-01-07 PROCEDURE — 99222 1ST HOSP IP/OBS MODERATE 55: CPT

## 2020-01-07 RX ORDER — DIPHENHYDRAMINE HCL 50 MG
50 CAPSULE ORAL EVERY 6 HOURS
Refills: 0 | Status: DISCONTINUED | OUTPATIENT
Start: 2020-01-07 | End: 2020-01-15

## 2020-01-07 RX ORDER — HALOPERIDOL DECANOATE 100 MG/ML
5 INJECTION INTRAMUSCULAR ONCE
Refills: 0 | Status: DISCONTINUED | OUTPATIENT
Start: 2020-01-07 | End: 2020-01-07

## 2020-01-07 RX ORDER — HALOPERIDOL DECANOATE 100 MG/ML
5 INJECTION INTRAMUSCULAR EVERY 6 HOURS
Refills: 0 | Status: DISCONTINUED | OUTPATIENT
Start: 2020-01-07 | End: 2020-01-15

## 2020-01-07 RX ORDER — DIPHENHYDRAMINE HCL 50 MG
50 CAPSULE ORAL AT BEDTIME
Refills: 0 | Status: DISCONTINUED | OUTPATIENT
Start: 2020-01-07 | End: 2020-01-15

## 2020-01-07 RX ADMIN — Medication 1 TABLET(S): at 08:36

## 2020-01-08 PROCEDURE — 99232 SBSQ HOSP IP/OBS MODERATE 35: CPT

## 2020-01-08 PROCEDURE — 90853 GROUP PSYCHOTHERAPY: CPT

## 2020-01-08 RX ADMIN — Medication 1 TABLET(S): at 08:20

## 2020-01-09 PROCEDURE — 99232 SBSQ HOSP IP/OBS MODERATE 35: CPT

## 2020-01-09 RX ORDER — POLYETHYLENE GLYCOL 3350 17 G/17G
17 POWDER, FOR SOLUTION ORAL DAILY
Refills: 0 | Status: DISCONTINUED | OUTPATIENT
Start: 2020-01-09 | End: 2020-01-15

## 2020-01-09 RX ORDER — SERTRALINE 25 MG/1
50 TABLET, FILM COATED ORAL DAILY
Refills: 0 | Status: DISCONTINUED | OUTPATIENT
Start: 2020-01-09 | End: 2020-01-13

## 2020-01-09 RX ADMIN — SERTRALINE 25 MILLIGRAM(S): 25 TABLET, FILM COATED ORAL at 09:06

## 2020-01-09 RX ADMIN — Medication 1 TABLET(S): at 08:33

## 2020-01-10 PROCEDURE — 99232 SBSQ HOSP IP/OBS MODERATE 35: CPT

## 2020-01-10 PROCEDURE — 90853 GROUP PSYCHOTHERAPY: CPT

## 2020-01-10 RX ADMIN — POLYETHYLENE GLYCOL 3350 17 GRAM(S): 17 POWDER, FOR SOLUTION ORAL at 08:45

## 2020-01-10 RX ADMIN — SERTRALINE 25 MILLIGRAM(S): 25 TABLET, FILM COATED ORAL at 08:39

## 2020-01-10 RX ADMIN — Medication 1 TABLET(S): at 08:39

## 2020-01-11 PROCEDURE — 99232 SBSQ HOSP IP/OBS MODERATE 35: CPT

## 2020-01-11 RX ADMIN — ONDANSETRON 4 MILLIGRAM(S): 8 TABLET, FILM COATED ORAL at 13:22

## 2020-01-11 RX ADMIN — Medication 1 TABLET(S): at 09:02

## 2020-01-11 RX ADMIN — SERTRALINE 50 MILLIGRAM(S): 25 TABLET, FILM COATED ORAL at 09:01

## 2020-01-12 RX ADMIN — SERTRALINE 50 MILLIGRAM(S): 25 TABLET, FILM COATED ORAL at 09:16

## 2020-01-12 RX ADMIN — Medication 1 TABLET(S): at 09:16

## 2020-01-13 PROCEDURE — 99221 1ST HOSP IP/OBS SF/LOW 40: CPT | Mod: GC

## 2020-01-13 RX ORDER — SERTRALINE 25 MG/1
25 TABLET, FILM COATED ORAL ONCE
Refills: 0 | Status: COMPLETED | OUTPATIENT
Start: 2020-01-13 | End: 2020-01-13

## 2020-01-13 RX ORDER — SERTRALINE 25 MG/1
75 TABLET, FILM COATED ORAL DAILY
Refills: 0 | Status: DISCONTINUED | OUTPATIENT
Start: 2020-01-14 | End: 2020-01-15

## 2020-01-13 RX ADMIN — Medication 1 TABLET(S): at 08:13

## 2020-01-13 RX ADMIN — SERTRALINE 50 MILLIGRAM(S): 25 TABLET, FILM COATED ORAL at 08:13

## 2020-01-13 RX ADMIN — SERTRALINE 25 MILLIGRAM(S): 25 TABLET, FILM COATED ORAL at 08:45

## 2020-01-14 PROCEDURE — 99232 SBSQ HOSP IP/OBS MODERATE 35: CPT

## 2020-01-14 RX ADMIN — SERTRALINE 75 MILLIGRAM(S): 25 TABLET, FILM COATED ORAL at 08:40

## 2020-01-14 RX ADMIN — Medication 1 TABLET(S): at 08:40

## 2020-01-15 VITALS — TEMPERATURE: 98 F | RESPIRATION RATE: 18 BRPM

## 2020-01-15 PROCEDURE — 90853 GROUP PSYCHOTHERAPY: CPT

## 2020-01-15 PROCEDURE — 99238 HOSP IP/OBS DSCHRG MGMT 30/<: CPT

## 2020-01-15 RX ORDER — POLYETHYLENE GLYCOL 3350 17 G/17G
17 POWDER, FOR SOLUTION ORAL
Qty: 0 | Refills: 0 | DISCHARGE
Start: 2020-01-15

## 2020-01-15 RX ORDER — SERTRALINE 25 MG/1
3 TABLET, FILM COATED ORAL
Qty: 90 | Refills: 0
Start: 2020-01-15 | End: 2020-02-13

## 2020-01-15 RX ORDER — SERTRALINE 25 MG/1
1 TABLET, FILM COATED ORAL
Qty: 30 | Refills: 0
Start: 2020-01-15 | End: 2020-02-13

## 2020-01-15 RX ADMIN — SERTRALINE 75 MILLIGRAM(S): 25 TABLET, FILM COATED ORAL at 08:22

## 2020-01-15 RX ADMIN — Medication 1 TABLET(S): at 08:22

## 2020-08-29 ENCOUNTER — EMERGENCY (EMERGENCY)
Facility: HOSPITAL | Age: 20
LOS: 1 days | Discharge: STILL ADMITTED OR ADMINISTRATIVE | End: 2020-08-29
Admitting: EMERGENCY MEDICINE

## 2020-08-29 ENCOUNTER — INPATIENT (INPATIENT)
Facility: HOSPITAL | Age: 20
LOS: 4 days | Discharge: HOME HEALTH | End: 2020-09-03
Attending: SPECIALIST | Admitting: SPECIALIST
Payer: MEDICAID

## 2020-08-29 VITALS — HEIGHT: 64 IN | WEIGHT: 240.08 LBS

## 2020-08-29 VITALS
HEART RATE: 75 BPM | DIASTOLIC BLOOD PRESSURE: 65 MMHG | TEMPERATURE: 99 F | OXYGEN SATURATION: 100 % | RESPIRATION RATE: 16 BRPM | SYSTOLIC BLOOD PRESSURE: 112 MMHG

## 2020-08-29 DIAGNOSIS — Z3A.00 WEEKS OF GESTATION OF PREGNANCY NOT SPECIFIED: ICD-10-CM

## 2020-08-29 DIAGNOSIS — O26.899 OTHER SPECIFIED PREGNANCY RELATED CONDITIONS, UNSPECIFIED TRIMESTER: ICD-10-CM

## 2020-08-29 DIAGNOSIS — O03.9 COMPLETE OR UNSPECIFIED SPONTANEOUS ABORTION WITHOUT COMPLICATION: Chronic | ICD-10-CM

## 2020-08-29 LAB
ALBUMIN SERPL ELPH-MCNC: 3.5 G/DL — SIGNIFICANT CHANGE UP (ref 3.3–5)
ALP SERPL-CCNC: 176 U/L — HIGH (ref 40–120)
ALT FLD-CCNC: 8 U/L — SIGNIFICANT CHANGE UP (ref 4–33)
AMPHET UR-MCNC: NEGATIVE — SIGNIFICANT CHANGE UP
ANION GAP SERPL CALC-SCNC: 11 MMO/L — SIGNIFICANT CHANGE UP (ref 7–14)
AST SERPL-CCNC: 16 U/L — SIGNIFICANT CHANGE UP (ref 4–32)
BARBITURATES UR SCN-MCNC: NEGATIVE — SIGNIFICANT CHANGE UP
BASOPHILS # BLD AUTO: 0.03 K/UL — SIGNIFICANT CHANGE UP (ref 0–0.2)
BASOPHILS NFR BLD AUTO: 0.2 % — SIGNIFICANT CHANGE UP (ref 0–2)
BENZODIAZ UR-MCNC: NEGATIVE — SIGNIFICANT CHANGE UP
BILIRUB SERPL-MCNC: < 0.2 MG/DL — LOW (ref 0.2–1.2)
BLD GP AB SCN SERPL QL: NEGATIVE — SIGNIFICANT CHANGE UP
BUN SERPL-MCNC: 7 MG/DL — SIGNIFICANT CHANGE UP (ref 7–23)
CALCIUM SERPL-MCNC: 8.6 MG/DL — SIGNIFICANT CHANGE UP (ref 8.4–10.5)
CANNABINOIDS UR-MCNC: NEGATIVE — SIGNIFICANT CHANGE UP
CHLORIDE SERPL-SCNC: 109 MMOL/L — HIGH (ref 98–107)
CO2 SERPL-SCNC: 18 MMOL/L — LOW (ref 22–31)
COCAINE METAB.OTHER UR-MCNC: NEGATIVE — SIGNIFICANT CHANGE UP
CREAT SERPL-MCNC: 0.55 MG/DL — SIGNIFICANT CHANGE UP (ref 0.5–1.3)
EOSINOPHIL # BLD AUTO: 0.1 K/UL — SIGNIFICANT CHANGE UP (ref 0–0.5)
EOSINOPHIL NFR BLD AUTO: 0.8 % — SIGNIFICANT CHANGE UP (ref 0–6)
GLUCOSE SERPL-MCNC: 79 MG/DL — SIGNIFICANT CHANGE UP (ref 70–99)
HBV SURFACE AG SER-ACNC: NEGATIVE — SIGNIFICANT CHANGE UP
HCT VFR BLD CALC: 36.4 % — SIGNIFICANT CHANGE UP (ref 34.5–45)
HGB BLD-MCNC: 12.4 G/DL — SIGNIFICANT CHANGE UP (ref 11.5–15.5)
HIV COMBO RESULT: HIGH
HIV COMBO RESULT: HIGH
HIV1+2 AB SPEC QL: HIGH
HIV1+2 AB SPEC QL: HIGH
IMM GRANULOCYTES NFR BLD AUTO: 0.5 % — SIGNIFICANT CHANGE UP (ref 0–1.5)
LYMPHOCYTES # BLD AUTO: 24.1 % — SIGNIFICANT CHANGE UP (ref 13–44)
LYMPHOCYTES # BLD AUTO: 3.21 K/UL — SIGNIFICANT CHANGE UP (ref 1–3.3)
MCHC RBC-ENTMCNC: 31.6 PG — SIGNIFICANT CHANGE UP (ref 27–34)
MCHC RBC-ENTMCNC: 34.1 % — SIGNIFICANT CHANGE UP (ref 32–36)
MCV RBC AUTO: 92.9 FL — SIGNIFICANT CHANGE UP (ref 80–100)
METHADONE UR-MCNC: NEGATIVE — SIGNIFICANT CHANGE UP
MONOCYTES # BLD AUTO: 0.78 K/UL — SIGNIFICANT CHANGE UP (ref 0–0.9)
MONOCYTES NFR BLD AUTO: 5.9 % — SIGNIFICANT CHANGE UP (ref 2–14)
NEUTROPHILS # BLD AUTO: 9.12 K/UL — HIGH (ref 1.8–7.4)
NEUTROPHILS NFR BLD AUTO: 68.5 % — SIGNIFICANT CHANGE UP (ref 43–77)
NRBC # FLD: 0 K/UL — SIGNIFICANT CHANGE UP (ref 0–0)
OPIATES UR-MCNC: NEGATIVE — SIGNIFICANT CHANGE UP
OXYCODONE UR-MCNC: NEGATIVE — SIGNIFICANT CHANGE UP
PCP UR-MCNC: NEGATIVE — SIGNIFICANT CHANGE UP
PLATELET # BLD AUTO: 201 K/UL — SIGNIFICANT CHANGE UP (ref 150–400)
PMV BLD: 11.5 FL — SIGNIFICANT CHANGE UP (ref 7–13)
POTASSIUM SERPL-MCNC: 3.8 MMOL/L — SIGNIFICANT CHANGE UP (ref 3.5–5.3)
POTASSIUM SERPL-SCNC: 3.8 MMOL/L — SIGNIFICANT CHANGE UP (ref 3.5–5.3)
PROT SERPL-MCNC: 6.3 G/DL — SIGNIFICANT CHANGE UP (ref 6–8.3)
RBC # BLD: 3.92 M/UL — SIGNIFICANT CHANGE UP (ref 3.8–5.2)
RBC # FLD: 14.4 % — SIGNIFICANT CHANGE UP (ref 10.3–14.5)
RH IG SCN BLD-IMP: POSITIVE — SIGNIFICANT CHANGE UP
RH IG SCN BLD-IMP: POSITIVE — SIGNIFICANT CHANGE UP
RUBV IGG SER-ACNC: 3.8 INDEX — SIGNIFICANT CHANGE UP
RUBV IGG SER-IMP: POSITIVE — SIGNIFICANT CHANGE UP
SARS-COV-2 RNA SPEC QL NAA+PROBE: SIGNIFICANT CHANGE UP
SODIUM SERPL-SCNC: 138 MMOL/L — SIGNIFICANT CHANGE UP (ref 135–145)
T PALLIDUM AB TITR SER: NEGATIVE — SIGNIFICANT CHANGE UP
WBC # BLD: 13.31 K/UL — HIGH (ref 3.8–10.5)
WBC # FLD AUTO: 13.31 K/UL — HIGH (ref 3.8–10.5)

## 2020-08-29 PROCEDURE — 99223 1ST HOSP IP/OBS HIGH 75: CPT | Mod: GC

## 2020-08-29 RX ORDER — OXYTOCIN 10 UNIT/ML
333.33 VIAL (ML) INJECTION
Qty: 20 | Refills: 0 | Status: DISCONTINUED | OUTPATIENT
Start: 2020-08-29 | End: 2020-08-30

## 2020-08-29 RX ORDER — SODIUM CHLORIDE 9 MG/ML
1000 INJECTION, SOLUTION INTRAVENOUS
Refills: 0 | Status: DISCONTINUED | OUTPATIENT
Start: 2020-08-29 | End: 2020-08-29

## 2020-08-29 RX ORDER — OXYTOCIN 10 UNIT/ML
333.33 VIAL (ML) INJECTION
Qty: 20 | Refills: 0 | Status: DISCONTINUED | OUTPATIENT
Start: 2020-08-29 | End: 2020-08-29

## 2020-08-29 NOTE — OB RN PATIENT PROFILE - SURGICAL SITE INCISION
EXAM DATE/TIME:  11/01/2017 00:00 

 

[facility] COMPARISON:  

No previous studies available for comparison.

                   

 

INDICATIONS :               

Patient no longer in need of port, here for port removal.

                   

 

MEDICAL HISTORY :     

Esophageal carcinoma, 2005 prostate cancer, hypertension, hypercholesterolemia, GERD, EtOH abuse and 
tobacco abuse

 

SURGICAL HISTORY :     

Left ankle surgery, left inguinal hernia repair, 2005 Prostate cancer treated wt radioactive seeds 
with palladium.

 

ENCOUNTER:     

Initial

 

ACUITY:     

1 year

 

PAIN SCORE:     

0/10

                   

                   0 minutes

 

1.)  100 mcg fentanyl (Sublimaze)  IV     

      

      

 

PROCEDURE :     

1.  Removal of Cdbsxz-k-lczc.

2.  Conscious sedation with continuous EKG and oximetry monitoring.

 

The risk, benefits and potential complications of Dpkhkk-a-Oslh removal were discussed. Written conse
nt was

obtained.

 

The patient was placed supine.  The chest wall was prepped in sterile fashion.  Full sterile techniqu
e was used, including cap, mask, sterile gloves and gown, and a large sterile sheet.  Hand hygiene an
d 2% chlorhexidine and/or Betadine/alcohol prep was utilized per protocol for cutaneous antisepsis.  
The skin and subcutaneous tissues were infiltrated with local anesthetic solution. A small incision w
as made, the subcutaneous pocket was opened. The port was dissected from the subcutaneous tissues and
 easily removed in one piece.  The pocket incision was closed with subcuticular Vicryl suture.  Steri
-Strips were applied.

 

Conscious sedation was performed with the prescribed dosages and duration as above in the presence of
 an independent trained radiology nurse to assist in the monitoring of the patient.  EKG and oximetry
 remained stable throughout the procedure. The patient tolerated the procedure well and there were no
 complications.  The patient was sent to post anesthesia recovery in stable condition.

 

CONCLUSION:     Uncomplicated port removal as above.

 

 

 

 Franklin Hunter MD on November 01, 2017 at 12:29           

Board Certified Radiologist.

 This report was verified electronically.
EXAM DATE/TIME:  11/01/2017 00:00 

 

[facility] COMPARISON:  

No previous studies available for comparison.

                   

 

INDICATIONS :               

Patient no longer in need of port, here for port removal.

                   

 

MEDICAL HISTORY :     

Esophageal carcinoma, 2005 prostate cancer, hypertension, hypercholesterolemia, GERD, EtOH abuse and 
tobacco abuse

 

SURGICAL HISTORY :     

Left ankle surgery, left inguinal hernia repair, 2005 Prostate cancer treated wt radioactive seeds 
with palladium.

 

ENCOUNTER:     

Initial

 

ACUITY:     

1 year

 

PAIN SCORE:     

0/10

                   

                   0 minutes

 

1.)  100 mcg fentanyl (Sublimaze)  IV     

      

      

 

PROCEDURE :     

1.  Removal of Ourdqt-e-zfge.

2.  Conscious sedation with continuous EKG and oximetry monitoring.

 

The risk, benefits and potential complications of Khxsia-q-Ajvx removal were discussed. Written conse
nt was

obtained.

 

The patient was placed supine.  The chest wall was prepped in sterile fashion.  Full sterile techniqu
e was used, including cap, mask, sterile gloves and gown, and a large sterile sheet.  Hand hygiene an
d 2% chlorhexidine and/or Betadine/alcohol prep was utilized per protocol for cutaneous antisepsis.  
The skin and subcutaneous tissues were infiltrated with local anesthetic solution. A small incision w
as made, the subcutaneous pocket was opened. The port was dissected from the subcutaneous tissues and
 easily removed in one piece.  The pocket incision was closed with subcuticular Vicryl suture.  Steri
-Strips were applied.

 

Conscious sedation was performed with the prescribed dosages and duration as above in the presence of
 an independent trained radiology nurse to assist in the monitoring of the patient.  EKG and oximetry
 remained stable throughout the procedure. The patient tolerated the procedure well and there were no
 complications.  The patient was sent to post anesthesia recovery in stable condition.

 

CONCLUSION:     Uncomplicated port removal as above.

 

 

 

 Franklin Hunter MD on November 01, 2017 at 12:29           

Board Certified Radiologist.

 This report was verified electronically.
EXAM DATE/TIME:  11/01/2017 00:00 

 

[facility] COMPARISON:  

No previous studies available for comparison.

                   

 

INDICATIONS :               

Patient no longer in need of port, here for port removal.

                   

 

MEDICAL HISTORY :     

Esophageal carcinoma, 2005 prostate cancer, hypertension, hypercholesterolemia, GERD, EtOH abuse and 
tobacco abuse

 

SURGICAL HISTORY :     

Left ankle surgery, left inguinal hernia repair, 2005 Prostate cancer treated wt radioactive seeds 
with palladium.

 

ENCOUNTER:     

Initial

 

ACUITY:     

1 year

 

PAIN SCORE:     

0/10

                   

                   0 minutes

 

1.)  100 mcg fentanyl (Sublimaze)  IV     

      

      

 

PROCEDURE :     

1.  Removal of Zzzgfj-t-fuuh.

2.  Conscious sedation with continuous EKG and oximetry monitoring.

 

The risk, benefits and potential complications of Kttfxw-c-Bcxu removal were discussed. Written conse
nt was

obtained.

 

The patient was placed supine.  The chest wall was prepped in sterile fashion.  Full sterile techniqu
e was used, including cap, mask, sterile gloves and gown, and a large sterile sheet.  Hand hygiene an
d 2% chlorhexidine and/or Betadine/alcohol prep was utilized per protocol for cutaneous antisepsis.  
The skin and subcutaneous tissues were infiltrated with local anesthetic solution. A small incision w
as made, the subcutaneous pocket was opened. The port was dissected from the subcutaneous tissues and
 easily removed in one piece.  The pocket incision was closed with subcuticular Vicryl suture.  Steri
-Strips were applied.

 

Conscious sedation was performed with the prescribed dosages and duration as above in the presence of
 an independent trained radiology nurse to assist in the monitoring of the patient.  EKG and oximetry
 remained stable throughout the procedure. The patient tolerated the procedure well and there were no
 complications.  The patient was sent to post anesthesia recovery in stable condition.

 

CONCLUSION:     Uncomplicated port removal as above.

 

 

 

 Franklin Hunter MD on November 01, 2017 at 12:29           

Board Certified Radiologist.

 This report was verified electronically.
no

## 2020-08-29 NOTE — PROVIDER CONTACT NOTE (OTHER) - ACTION/TREATMENT ORDERED:
HIV 1/2 combo result  HIV-1 Viral Load.   RN to stop induction. HIV 1/2 combo result  HIV-1 Viral Load.   RN to stop induction.  MD to check v/e of patient.   MD to obtain ID consult

## 2020-08-29 NOTE — ED ADULT NURSE NOTE - CHIEF COMPLAINT QUOTE
Pt 41 weeks pregnant c/o labor with contractions every 10 min.  Due date: 8/23.  MD at [facility].

## 2020-08-29 NOTE — OB PROVIDER H&P - ASSESSMENT
19 y/o   @ 40.6 wks gestation presents via EMS with c/o painful uterine contractions every 10 minutes since 0500 denies any LOF or VB reports +FM denies any n/v/d denies any fever or chills ap care uncomplicated thus far      pt receives her PNC @ [facility] and is sched for IOL on   pt hospitalized @ [facility] 2020 x's 2 weeks for depression , currently no meds  pt was on vaginal progesterone and only took 5 doses       abdomen: soft, nt on palp  SVE: /-3  T(C): 37.1 (20 @ 09:26), Max: 37.2 (20 @ 09:23)  HR: 72 (20 @ 09:32) (72 - 75)  BP: 124/76 (20 @ 09:32) (112/65 - 124/76)  RR: 17 (20 @ 09:23) (16 - 17)  SpO2: 100% (20 @ 09:01) (100% - 100%)        NKA  med hx: denies  surg hx:   denies  gyn hx:   denies  ob hx:   SAB x's 1 complete  2019 IUFD @ 20wks gest   psychosocial hx:  hx depression/ anxiety -  pt hospitalized @ [facility] 2020 x's 2 weeks for depression , currently no meds  meds:   PNV       ht: 5'4  wt: 240 lbs        addendum @ 1010 :  TAS: BPP:  vtx posterior placenta SYDNIE: 2.58 EFW: 3482 grams       plan of care d/w dr rainey/ dr gallardo   admit to l&D  oligo @ 40.6 wks gest for po cytotec IOL  see admission orders 19 y/o   @ 40.6 wks gestation presents via EMS with c/o painful uterine contractions every 10 minutes since 0500 denies any LOF or VB reports +FM denies any n/v/d denies any fever or chills ap care uncomplicated thus far      pt receives her PNC @ [facility] and is sched for IOL on   pt hospitalized @ [facility] 2020 x's 2 weeks for depression , currently no meds  pt was on vaginal progesterone and only took 5 doses       abdomen: soft, nt on palp  SVE: /-3  T(C): 37.1 (20 @ 09:26), Max: 37.2 (20 @ 09:23)  HR: 72 (20 @ 09:32) (72 - 75)  BP: 124/76 (20 @ 09:32) (112/65 - 124/76)  RR: 17 (20 @ 09:23) (16 - 17)  SpO2: 100% (20 @ 09:01) (100% - 100%)        NKA  med hx: denies  surg hx:   denies  gyn hx:   denies  ob hx:   SAB x's 1 complete  2019 IUFD @ 20wks gest   psychosocial hx:  hx depression/ anxiety -  pt hospitalized @ [facility] 2020 x's 2 weeks for depression , currently no meds  meds:   PNV       ht: 5'4  wt: 240 lbs        addendum @ 1010 :  TAS: BPP:  vtx posterior placenta SYDNIE: 2.58 EFW: 3482 grams       plan of care d/w dr arce/ dr gallardo   admit to l&D  oligo @ 40.6 wks gest for po cytotec IOL  see admission orders 21 y/o   @ 40.6 wks gestation presents via EMS with c/o painful uterine contractions every 10 minutes since 0500 denies any LOF or VB reports +FM denies any n/v/d denies any fever or chills ap care uncomplicated thus far      pt receives her PNC @ [facility] and is sched for IOL on   pt hospitalized @ [facility] 2020 x's 2 weeks for depression , currently no meds  pt was on vaginal progesterone and only took 5 doses       abdomen: soft, nt on palp  SVE: /-3  T(C): 37.1 (20 @ 09:26), Max: 37.2 (20 @ 09:23)  HR: 72 (20 @ 09:32) (72 - 75)  BP: 124/76 (20 @ 09:32) (112/65 - 124/76)  RR: 17 (20 @ 09:23) (16 - 17)  SpO2: 100% (20 @ 09:01) (100% - 100%)        NKA  med hx: denies  surg hx:   denies  gyn hx:   denies  ob hx:   SAB x's 1 complete  2019 IUFD @ 20wks gest   psychosocial hx:  hx depression/ anxiety -  pt hospitalized @ [facility] 2020 x's 2 weeks for depression , currently no meds  meds:   PNV       ht: 5'4  wt: 240 lbs        addendum @ 1010 :  TAS: BPP:  vtx posterior placenta SYDNIE: 2.58 EFW: 3482 grams   cat 1 fHT  toco: irregular      plan of care d/w dr arce/ dr gallardo   admit to l&D  oligo @ 40.6 wks gest for po cytotec IOL  see admission orders      awaiting prenatal records from [facility] L&D

## 2020-08-29 NOTE — CHART NOTE - NSCHARTNOTEFT_GEN_A_CORE
R4 OB Chart Note    Bedside sono performed by Dr. Ingram.  SYDNIE 10.5, vtx.      Plan  - IOL stopped, pt with rare ctx and no cervical change  - continue inpt care: NST BID, reg diet, SLIV  - f/u HIV labs  - f/u ID recs  - given no signs of active labor and normal SYDNIE, will hold off on AZT for now; if pt goes into labor or if fetal wellbeing is nonreassuring and viral load still pending, will give AZT and plan for delivery  - will call core lab to expedite viral load  - plan explained to pt and FOB and they verbalized understanding and agreement with plan    d/w Dr. Ingram and Dr. Eugene Betancourt MD PGY4

## 2020-08-29 NOTE — OB RN PATIENT PROFILE - SUBSTANCE USE COMMENT, PROFILE
cannaboid + urine toxicology in 10/7/2019; current urine toxicology negative. pt denies current use during pregnancy

## 2020-08-29 NOTE — ED ADULT TRIAGE NOTE - CHIEF COMPLAINT QUOTE
Pt 41 weeks pregnant c/o labor with contractions every 10 min.  Due date: 8/23.  MD at [facility].

## 2020-08-29 NOTE — OB PROVIDER TRIAGE NOTE - NSOBPROVIDERNOTE_OBGYN_ALL_OB_FT
19 y/o   @ 40.6 wks gestation presents via EMS with c/o painful uterine contractions every 10 minutes since 0500 denies any LOF or VB reports +FM denies any n/v/d denies any fever or chills ap care uncomplicated thus far      pt receives her PNC @ [facility] and is sched for IOL on   pt hospitalized @ [facility] 2020 x's 2 weeks for depression , currently no meds  pt was on vaginal progesterone and only took 5 doses       abdomen: soft, nt on palp  SVE: /-3  T(C): 37.1 (20 @ 09:26), Max: 37.2 (20 @ 09:23)  HR: 72 (20 @ 09:32) (72 - 75)  BP: 124/76 (20 @ 09:32) (112/65 - 124/76)  RR: 17 (20 @ 09:23) (16 - 17)  SpO2: 100% (20 @ 09:01) (100% - 100%)        NKA  med hx: denies  surg hx:   denies  gyn hx:   denies  ob hx:   SAB x's 1 complete  2019 IUFD @ 20wks gest   psychosocial hx:  hx depression/ anxiety -  pt hospitalized @ [facility] 2020 x's 2 weeks for depression , currently no meds  meds:   PNV       ht: 5'4  wt: 240 lbs 19 y/o   @ 40.6 wks gestation presents via EMS with c/o painful uterine contractions every 10 minutes since 0500 denies any LOF or VB reports +FM denies any n/v/d denies any fever or chills ap care uncomplicated thus far      pt receives her PNC @ [facility] and is sched for IOL on   pt hospitalized @ [facility] 2020 x's 2 weeks for depression , currently no meds  pt was on vaginal progesterone and only took 5 doses       abdomen: soft, nt on palp  SVE: /-3  T(C): 37.1 (20 @ 09:26), Max: 37.2 (20 @ 09:23)  HR: 72 (20 @ 09:32) (72 - 75)  BP: 124/76 (20 @ 09:32) (112/65 - 124/76)  RR: 17 (20 @ 09:23) (16 - 17)  SpO2: 100% (20 @ 09:01) (100% - 100%)        NKA  med hx: denies  surg hx:   denies  gyn hx:   denies  ob hx:   SAB x's 1 complete  2019 IUFD @ 20wks gest   psychosocial hx:  hx depression/ anxiety -  pt hospitalized @ [facility] 2020 x's 2 weeks for depression , currently no meds  meds:   PNV       ht: 5'4  wt: 240 lbs        addendum @ 1010 :  TAS: BPP:  vtx posterior placenta SYDNIE: 2.58 EFW: 3482 grams       plan of care d/w dr rainey  admit to l&D  oligo @ 40.6 wks gest for po cytotec IOL  see admission orders 21 y/o   @ 40.6 wks gestation presents via EMS with c/o painful uterine contractions every 10 minutes since 0500 denies any LOF or VB reports +FM denies any n/v/d denies any fever or chills ap care uncomplicated thus far      pt receives her PNC @ [facility] and is sched for IOL on   pt hospitalized @ [facility] 2020 x's 2 weeks for depression , currently no meds  pt was on vaginal progesterone and only took 5 doses       abdomen: soft, nt on palp  SVE: /-3  T(C): 37.1 (20 @ 09:26), Max: 37.2 (20 @ 09:23)  HR: 72 (20 @ 09:32) (72 - 75)  BP: 124/76 (20 @ 09:32) (112/65 - 124/76)  RR: 17 (20 @ 09:23) (16 - 17)  SpO2: 100% (20 @ 09:01) (100% - 100%)        NKA  med hx: denies  surg hx:   denies  gyn hx:   denies  ob hx:   SAB x's 1 complete  2019 IUFD @ 20wks gest   psychosocial hx:  hx depression/ anxiety -  pt hospitalized @ [facility] 2020 x's 2 weeks for depression , currently no meds  meds:   PNV       ht: 5'4  wt: 240 lbs        addendum @ 1010 :  TAS: BPP:  vtx posterior placenta SYDNIE: 2.58 EFW: 3482 grams       plan of care d/w dr arce/ paulina  admit to l&D  oligo @ 40.6 wks gest for po cytotec IOL  see admission orders

## 2020-08-29 NOTE — OB RN PATIENT PROFILE - PMH
Depression  hospitalized Feb 2020  History of IUFD  @ 20? weeks no fhr Depression  hospitalized Feb 2020. was taking zoloft, patient quit taking zoloft after 2 weeks without speaking with provider  History of IUFD  @ 20? weeks no fhr. May 2019.

## 2020-08-29 NOTE — PROVIDER CONTACT NOTE (OTHER) - ASSESSMENT
pt reported  no issues with pregnancy or prenatal care. pt reported  no issues with pregnancy or prenatal care. patient received 2 doses of PO cytotec. patient preliminary HIV 1/2 combo result is preliminary result was positive. RN received prenatals from [facility] after labs were  drawn in [facility]. The prenatals from the clinic were non reactive 7/27/20.

## 2020-08-29 NOTE — CONSULT NOTE ADULT - ATTENDING COMMENTS
Yuli Badillo MD  Pager: [phone]  After 5 PM or weekends please call fellow on call or office [phone]

## 2020-08-29 NOTE — OB PROVIDER TRIAGE NOTE - NS_OBACUITY_OBGYN_ALL_OB_DT
[facility] EMERGENCY DEPARTMENT  [address] [address]  Phone: [phone]  Fax: [phone]    May 22, 2017        Mary Bueno  [address] [address]          To whom it may concern:    RE: Mary Lloyd was evaluated in the emergency department for a condition on 5/22/17.  I recommend she rest the next 24-48 hours with limited activity as tolerated by her symptoms.      Please contact me for questions or concerns.      Sincerely,        Manisha Odom PA-C    
29-Aug-2020 09:16

## 2020-08-29 NOTE — PROVIDER CONTACT NOTE (OTHER) - BACKGROUND
pt received care at [facility]. pt admitted for oligo induction. pt . edc-20. baby is 40.6. prenatal labs drawn and sent in triage.

## 2020-08-29 NOTE — CONSULT NOTE ADULT - SUBJECTIVE AND OBJECTIVE BOX
INFECTIOUS DISEASE SERVICE INITIAL CONSULTATION NOTE    HPI:  20F in her 3rd trimester of pregnancy presented with contractions. ID consulted for preliminary positive HIV screen. The patient had prenatal care at [facility], and was HIV negative during her routine care.     PAST MEDICAL & SURGICAL HISTORY:  Depression: hospitalized 2020  History of IUFD: @ 20? weeks no fhr  Complete spontaneous       REVIEW OF SYSTEMS:  Constitutional: no weakness, no fevers, no chills  Dermatologic: no rash  Respiratory: no SOB, no cough  Cardiovascular: no chest pain, no palpitations  Gastrointestinal: no nausea, no vomiting, no diarrhea  Genitourinary: no dysuria, no urinary frequency, no hematuria, no urinary retention  Musculoskeletal:	no weakness, no joint swelling/pain  Neurological: no focal weakness or numbness  Endocrine: no polyuria, no polydipsia    ACTIVE ANTIMICROBIAL/ANTIBIOTIC MEDICATIONS:      OTHER MEDICATIONS:  lactated ringers. 1000 milliLiter(s) IV Continuous <Continuous>  misoprostol Oral Solution 20 MICROGram(s) Oral every 2 hours  misoprostol Oral Solution 40 MICROGram(s) Oral every 2 hours  misoprostol Oral Solution 60 MICROGram(s) Oral every 2 hours  oxytocin Infusion 333.333 milliUNIT(s)/Min IV Continuous <Continuous>      ALLERGIES:  Allergies    No Known Allergies    Intolerances    SOCIAL HISTORY: No drugs, alcohol, or smoking    FAMILY HISTORY:  No pertinent family history in first degree relatives      VITAL SIGNS:  ICU Vital Signs Last 24 Hrs  T(C): 36.5 (29 Aug 2020 11:41), Max: 37.2 (29 Aug 2020 09:23)  T(F): 97.7 (29 Aug 2020 11:41), Max: 99 (29 Aug 2020 09:23)  HR: 71 (29 Aug 2020 11:41) (71 - 78)  BP: 124/75 (29 Aug 2020 11:41) (86/51 - 128/58)  BP(mean): --  ABP: --  ABP(mean): --  RR: 16 (29 Aug 2020 11:41) (16 - 17)  SpO2: 100% (29 Aug 2020 09:01) (100% - 100%)      PHYSICAL EXAM:  Constitutional: Well appearing woman lying in bed comfortably, partner at bedside  Head: NC/AT  Eyes: anicteric sclera  ENMT: no rhinorrhea;  Neck: supple; no JVD or LAD  Respiratory: CTA B/L  Cardiovascular: +S1/S2, RRR;   Gastrointestinal: Gravid  Dermatologic: skin warm and dry; no visible rashes or lesions  Neurologic: AAOx3; no focal deficits    LABS:                        12.4   13.31 )-----------( 201      ( 29 Aug 2020 10:39 )             36.4         138  |  109<H>  |  7   ----------------------------<  79  3.8   |  18<L>  |  0.55    Ca    8.6      29 Aug 2020 16:04    TPro  6.3  /  Alb  3.5  /  TBili  < 0.2<L>  /  DBili  x   /  AST  16  /  ALT  8   /  AlkPhos  176<H>            MICROBIOLOGY:  COVID-19 PCR . (20 @ 11:03)    COVID-19 PCR: NotDetec:     HIV-1/2 Ag/Ab Combo (20 @ 10:39)    HIV Combo Result: PRELIM REACT Reactive results are presumptive and will be confirmed  according to a CDC/[place] approved algorithm.  If specimen  quantity is not sufficient, the result must be confirmed by  ordering the HIV1/2 Antigen/Antibody Screen with a new  specimen.    Hepatitis B Surface Antigen (20 @ 10:39)    Hepatitis B Surface Antigen: NEGATIVE

## 2020-08-29 NOTE — CONSULT NOTE ADULT - ASSESSMENT
20F in her 3rd trimester of pregnancy presented with contractions. ID consulted for preliminary positive HIV screen.  Per hospital policy; immediate initiation of appropriate antiretroviral prophylaxis(AZT infusion) should be recommended to women in labor on the basis of a reactive test result without waiting for the result of a confirmatory test.     Recommend:   - When the patient is in labor, would give AZT infusion per hospital policy  - Follow up confirmatory testing    Tiffanie Wolf, PGY-5  Infectious Disease Fellow   Pager: [phone]  After 5pm/weekends: [phone]

## 2020-08-29 NOTE — OB RN PATIENT PROFILE - PRO PRENATAL LABS ORI SOURCE BLOOD TYPE
Telephone Encounter by Juanito Yanes at 05/02/17 12:50 PM     Author:  Juanito Yanes Service:  (none) Author Type:  Patient      Filed:  05/02/17 12:50 PM Encounter Date:  5/2/2017 Status:  Signed     :  Juanito Yanes (Patient )            Scheduled for 5/9/17 with APC[OG1.1M]      Revision History        User Key Date/Time User Provider Type Action    > OG1.1 05/02/17 12:50 PM Juanito Yanes Patient  Sign    M - Manual             SCM

## 2020-08-29 NOTE — CHART NOTE - NSCHARTNOTEFT_GEN_A_CORE
R4 OB Chart Note    Notified by RN that HIV screen sent on admission resulted as prelim positive.  Discussed with MFM Fellow Kateryna and plan made as follows:  -stat repeat HIV screen  -stat viral load  -stat cd4/cd8 count  -pause induction of labor  -ID consult    Pt seen and evaluated at bedside.  Started discussion with patient about results of prelim HIV screen.  During discussion, FOB entered room unexpectedly without knocking and overheard discussion of positive result.  Discussed with pt that she had two negative HIV screens during the pregnancy, that sometimes the HIV screen can be a false positive, and that we won't know for sure until full HIV testing labs are resulted, which may take 2-3 days per [facility].  Advised the pt that we will pause her IOL at this time because of the risk to a baby for HIV transmission during vaginal delivery.  Advised pt that ID team will be coming to discuss the results, workup and likely treatment with AZT and possible  section depending on whether viral load is elevated.  Pt tearful at news but agreeable to plan     ABD: soft, NT  SVE 2/50/-3, soft, mid position  FHT: cat 1, bl 145, mod bryant, +accels, no decels  TOCO: rare ctx  PO: 22@14:05    A/P:  20y  at 40w6d admitted with oligohydramnios (SYDNIE 2.58), started on IOL with PO cytotec, found to have prelim HIV+ on HIV screen. - Pt counselled as above  - f/u HIV labs as above  - appreciate ID consultation and recs  - f/u stat CMP for AZT dosing  - stop IOL at this time  - per policy, CS is indicated if viral load >1000; given unknown viral load, if evidence of fetal distress, active labor, or ROM, will proceed with delivery    d/w Dr. Avendaño and Dr. Nataly Betancourt MD PGY4 R4 OB Chart Note    Notified by RN that HIV screen sent on admission resulted as prelim positive.  PNL from [facility] were obtained and showed HIV neg on 3/25/20 and 20.  Discussed with MFM Fellow Kateryna and plan made as follows:  -stat repeat HIV screen  -stat viral load  -stat cd4/cd8 count  -pause induction of labor  -ID consult    Pt seen and evaluated at bedside.  Started discussion with patient about results of prelim HIV screen.  During discussion, FOB entered room unexpectedly without knocking and overheard discussion of positive result.  Discussed with pt that she had two negative HIV screens during the pregnancy, that sometimes the HIV screen can be a false positive, and that we won't know for sure until full HIV testing labs are resulted, which may take 2-3 days per [facility].  Advised the pt that we will pause her IOL at this time because of the risk to a baby for HIV transmission during vaginal delivery.  Advised pt that ID team will be coming to discuss the results, workup and likely treatment with AZT and possible  section depending on whether viral load is elevated.  Pt tearful at news but agreeable to plan     ABD: soft, NT  SVE 2/50/-3, soft, mid position  FHT: cat 1, bl 145, mod bryant, +accels, no decels  TOCO: rare ctx  PO: 22@14:05    A/P:  20y  at 40w6d admitted with oligohydramnios (SYDNIE 2.58), started on IOL with PO cytotec, found to have prelim HIV+ on HIV screen. - Pt counselled as above  - f/u HIV labs as above  - appreciate ID consultation and recs  - f/u stat CMP for AZT dosing  - stop IOL at this time  - per policy, CS is indicated if viral load >1000; given unknown viral load, if evidence of fetal distress, active labor, or ROM, will proceed with delivery    d/w Dr. Avendaño and Dr. Nataly Betancourt MD PGY4

## 2020-08-29 NOTE — OB RN TRIAGE NOTE - MENTAL HEALTH CONDITIONS/SYMPTOMS, PROFILE
hospitalized in [facility] x 2weeks  Feb/anxiety disorder/depression

## 2020-08-30 DIAGNOSIS — O41.00X0 OLIGOHYDRAMNIOS, UNSPECIFIED TRIMESTER, NOT APPLICABLE OR UNSPECIFIED: ICD-10-CM

## 2020-08-30 LAB
HAV IGG SER QL IA: REACTIVE — HIGH
HCV AB S/CO SERPL IA: 0.12 S/CO — SIGNIFICANT CHANGE UP (ref 0–0.99)
HCV AB SERPL-IMP: SIGNIFICANT CHANGE UP
T GONDII IGG SER QL: <3 IU/ML — SIGNIFICANT CHANGE UP
T GONDII IGG SER QL: NEGATIVE — SIGNIFICANT CHANGE UP

## 2020-08-30 PROCEDURE — 99232 SBSQ HOSP IP/OBS MODERATE 35: CPT

## 2020-08-30 RX ORDER — FOLIC ACID 0.8 MG
1 TABLET ORAL DAILY
Refills: 0 | Status: DISCONTINUED | OUTPATIENT
Start: 2020-08-30 | End: 2020-09-01

## 2020-08-30 NOTE — PROGRESS NOTE ADULT - SUBJECTIVE AND OBJECTIVE BOX
R3 Antepartum Note: HD#2    Patient seen and examined at bedside, no acute overnight events. No acute complaints. Patient is ambulating, tolerating PO, voiding spontaneously. Patient reports +FM. Denies LOF, VB, ctx. Denies CP, SOB, N/V, fevers, and chills.    Vital Signs Last 24 Hours  T(C): 36.9 (08-29-20 @ 22:13), Max: 37.2 (08-29-20 @ 09:23)  HR: 78 (08-29-20 @ 22:13) (71 - 96)  BP: 126/70 (08-29-20 @ 22:13) (86/51 - 134/79)  RR: 17 (08-29-20 @ 22:13) (16 - 17)  SpO2: 100% (08-29-20 @ 22:13) (100% - 100%)      Physical Exam:  General: NAD  Abdomen: Soft, non-tender, gravid  Ext: No pain or swelling  NST reactive overnight      Labs:             12.4   13.31 )-----------( 201      ( 08-29 @ 10:39 )             36.4     08-29 @ 16:04    138  |  109  |  7   ----------------------------<  79  3.8   |  18  |  0.55    Ca    8.6      08-29 @ 16:04    TPro  6.3  /  Alb  3.5  /  TBili  < 0.2  /  DBili  x   /  AST  16  /  ALT  8   /  AlkPhos  176  08-29 @ 16:04        MEDICATIONS  (STANDING):  oxytocin Infusion 333.333 milliUNIT(s)/Min (1000 mL/Hr) IV Continuous <Continuous>    MEDICATIONS  (PRN):

## 2020-08-30 NOTE — PROGRESS NOTE ADULT - ASSESSMENT
A/P: 21y/o  at 41w0d GA admitted initially for IOL for presumed oligohydramnios, however, repeat US demonstrated a normal SYDNIE. Prenatal labs significant for prelim HIV+, repeat value is the same.     #Preliminary A/P: 19y/o  at 41w0d GA admitted initially for IOL for presumed oligohydramnios, however, repeat US demonstrated a normal SYDNIE. Prenatal labs significant for prelim HIV+, repeat value is the same. IOL was stopped due to a concern for potential active infection and the risk for vertical transmission in the event of a high viral load.    #HIV+ on screening test  -Repeat screen test is positive   -Confirmatory and vrial load sent and received by core lab; lab was called and bobby was placed for the specimen  -Hepatitis panel sent; HepBsAg neg   -Appreciate ID following the patient  -Per the policy, a C/S is indicated if viral load >1000; given unknown viral load, if evidence of fetal distress, active labor, or ROM, will proceed with delivery  -Per discussion with pharmacy, AZT is available if needed    #Fetal well-being   -[facility] Sono (): SYDNIE 10.5, MVP 5.2  -NST BID  -BPP daily    #Maternal well-being   -Utox neg  -Reg diet  -PNV, folic  -F/u quant gold, toxo    Abbey Padilla PGY-3

## 2020-08-31 ENCOUNTER — ASOB RESULT (OUTPATIENT)
Age: 20
End: 2020-08-31

## 2020-08-31 ENCOUNTER — APPOINTMENT (OUTPATIENT)
Dept: ANTEPARTUM | Facility: HOSPITAL | Age: 20
End: 2020-08-31
Payer: MEDICAID

## 2020-08-31 ENCOUNTER — TRANSCRIPTION ENCOUNTER (OUTPATIENT)
Age: 20
End: 2020-08-31

## 2020-08-31 LAB
BASOPHILS # BLD AUTO: 0.02 K/UL — SIGNIFICANT CHANGE UP (ref 0–0.2)
BASOPHILS NFR BLD AUTO: 0.2 % — SIGNIFICANT CHANGE UP (ref 0–2)
BLD GP AB SCN SERPL QL: NEGATIVE — SIGNIFICANT CHANGE UP
EOSINOPHIL # BLD AUTO: 0.09 K/UL — SIGNIFICANT CHANGE UP (ref 0–0.5)
EOSINOPHIL NFR BLD AUTO: 0.7 % — SIGNIFICANT CHANGE UP (ref 0–6)
HCT VFR BLD CALC: 37.8 % — SIGNIFICANT CHANGE UP (ref 34.5–45)
HGB BLD-MCNC: 12.8 G/DL — SIGNIFICANT CHANGE UP (ref 11.5–15.5)
HIV-1 VIRAL LOAD RESULT: SIGNIFICANT CHANGE UP
HIV1 RNA # SERPL NAA+PROBE: SIGNIFICANT CHANGE UP
HIV1 RNA SER-IMP: SIGNIFICANT CHANGE UP
HIV1 RNA SERPL NAA+PROBE-ACNC: SIGNIFICANT CHANGE UP
HIV1 RNA SERPL NAA+PROBE-LOG#: SIGNIFICANT CHANGE UP
HIV1+2 AB SPEC QL: SIGNIFICANT CHANGE UP
IMM GRANULOCYTES NFR BLD AUTO: 0.6 % — SIGNIFICANT CHANGE UP (ref 0–1.5)
LYMPHOCYTES # BLD AUTO: 24.4 % — SIGNIFICANT CHANGE UP (ref 13–44)
LYMPHOCYTES # BLD AUTO: 3.09 K/UL — SIGNIFICANT CHANGE UP (ref 1–3.3)
MCHC RBC-ENTMCNC: 30.3 PG — SIGNIFICANT CHANGE UP (ref 27–34)
MCHC RBC-ENTMCNC: 33.9 % — SIGNIFICANT CHANGE UP (ref 32–36)
MCV RBC AUTO: 89.6 FL — SIGNIFICANT CHANGE UP (ref 80–100)
MONOCYTES # BLD AUTO: 0.68 K/UL — SIGNIFICANT CHANGE UP (ref 0–0.9)
MONOCYTES NFR BLD AUTO: 5.4 % — SIGNIFICANT CHANGE UP (ref 2–14)
NEUTROPHILS # BLD AUTO: 8.72 K/UL — HIGH (ref 1.8–7.4)
NEUTROPHILS NFR BLD AUTO: 68.7 % — SIGNIFICANT CHANGE UP (ref 43–77)
NRBC # FLD: 0 K/UL — SIGNIFICANT CHANGE UP (ref 0–0)
PLATELET # BLD AUTO: 218 K/UL — SIGNIFICANT CHANGE UP (ref 150–400)
PMV BLD: 10.8 FL — SIGNIFICANT CHANGE UP (ref 7–13)
RBC # BLD: 4.22 M/UL — SIGNIFICANT CHANGE UP (ref 3.8–5.2)
RBC # FLD: 14.3 % — SIGNIFICANT CHANGE UP (ref 10.3–14.5)
RH IG SCN BLD-IMP: POSITIVE — SIGNIFICANT CHANGE UP
WBC # BLD: 12.68 K/UL — HIGH (ref 3.8–10.5)
WBC # FLD AUTO: 12.68 K/UL — HIGH (ref 3.8–10.5)

## 2020-08-31 PROCEDURE — 76811 OB US DETAILED SNGL FETUS: CPT | Mod: 26

## 2020-08-31 PROCEDURE — 76819 FETAL BIOPHYS PROFIL W/O NST: CPT | Mod: 26

## 2020-08-31 PROCEDURE — 99233 SBSQ HOSP IP/OBS HIGH 50: CPT

## 2020-08-31 PROCEDURE — 99232 SBSQ HOSP IP/OBS MODERATE 35: CPT | Mod: GC

## 2020-08-31 RX ORDER — HEPARIN SODIUM 5000 [USP'U]/ML
5000 INJECTION INTRAVENOUS; SUBCUTANEOUS EVERY 12 HOURS
Refills: 0 | Status: DISCONTINUED | OUTPATIENT
Start: 2020-08-31 | End: 2020-08-31

## 2020-08-31 NOTE — CHART NOTE - NSCHARTNOTEFT_GEN_A_CORE
R3 Chart Note:    Patient's confirmatory HIV test resulted negative. Following a discussion w/ ID, the decision was made to wait for the viral load. The viral load resulted as not detected. Therefore, this supports that the patient does not have HIV. As per the plan d/w MFM today, will proceed with an induction of labor for late term. The patient to receive vaginal cytotec.     D/w service attendings  Plan confirmed with Dr. Samaniego M Fellow  Abbey Padilla PGY-3

## 2020-08-31 NOTE — CHART NOTE - NSCHARTNOTEFT_GEN_A_CORE
Patient seen and examined for induction of labor. Pt denies contractions, vaginal bleeding, leaking fluid. Endorses good fetal movement.     PE:  Vital Signs Last 24 Hrs  T(C): 36.8 (31 Aug 2020 05:11), Max: 36.9 (31 Aug 2020 01:35)  T(F): 98.3 (31 Aug 2020 05:11), Max: 98.4 (31 Aug 2020 01:35)  HR: 67 (31 Aug 2020 09:12) (65 - 96)  BP: 121/62 (31 Aug 2020 09:12) (102/57 - 132/81)  BP(mean): --  RR: 18 (31 Aug 2020 05:11) (17 - 19)  SpO2: 93% (31 Aug 2020 05:11) (93% - 100%)  EFM: 135 moderate variability + acels no decels  Doran: irregular  VE:270/-3  Sono: done by ATU : cephalic presentation/ Anterior placenta/ SYDNIE 10/ BPP  EFW 3490grams      19y/o  at 41w1d GA admitted initially for IOL for presumed oligohydramnios, however, repeat US demonstrated a normal SYDNIE. Prenatal labs significant for prelim HIV+, confirmatory antibody test result is negative, now patient for induction of labor for late term.     - ID Dr. Badillo following patient; coming to bedside to assess/evaluate patient, will follow up final recs  - Pending ID will start induction of labor with vaginal cytotec  - continuous efm/toco  - repeat CBC/ type and screen    d/w Dr. Yahaira HERNANDEZ and Dr. Bolivar     Denae Berry [facility]

## 2020-08-31 NOTE — PROGRESS NOTE ADULT - SUBJECTIVE AND OBJECTIVE BOX
MFM Fellow    Patient seen and examined at bedside, no acute overnight events. No acute complaints. Pt reports +FM, denies LOF, VB, ctx, HA, epigastric pain, blurred vision, CP, SOB, N/V, fevers, and chills. Patient aware that we are awaiting confirmatory testing    Vital Signs Last 24 Hours  T(C): 36.8 (08-31-20 @ 05:11), Max: 36.9 (08-31-20 @ 01:35)  HR: 67 (08-31-20 @ 09:12) (65 - 96)  BP: 121/62 (08-31-20 @ 09:12) (102/57 - 132/81)  RR: 18 (08-31-20 @ 05:11) (17 - 19)  SpO2: 93% (08-31-20 @ 05:11) (93% - 100%)    Physical Exam:  General: NAD  Abdomen: Soft, non-tender, gravid  Ext: No pain or swelling    , mod bryant, +accels, - decels  TOCO irregular    ATU sonogram to be done today    Labs:             12.4   13.31 )-----------( 201      ( 08-29 @ 10:39 )             36.4     08-29 @ 16:04    138  |  109  |  7   ----------------------------<  79  3.8   |  18  |  0.55    Ca    8.6      08-29 @ 16:04    TPro  6.3  /  Alb  3.5  /  TBili  < 0.2  /  DBili  x   /  AST  16  /  ALT  8   /  AlkPhos  176  08-29 @ 16:04

## 2020-08-31 NOTE — PROGRESS NOTE ADULT - PROBLEM SELECTOR PLAN 1
Patient aware that we are awaiting confirmatory result of HIV testing as that would effect MOD and possible AZT needed during delivery  ID consulted on 8/29; AZT infusion per hospital policy if patient goes into labor  At present time, as fetal testing has been reassuring, can await confirmatory testing  Records from [facility] show results during pregnancy of negative results. Last test being 7/27; negative  Patient with a history of Depression, will make psych aware of patient after delivery. Monitor for signs/symptoms of depression  HSQ;SCD while in bed  PNV;Folic Acid    Seen with Dr Yahaira Samaniego MD

## 2020-08-31 NOTE — PROGRESS NOTE ADULT - ASSESSMENT
20F in her 3rd trimester of pregnancy presented 8/28 with contractions. ID consulted for preliminary positive HIV screen.    Patient received prenatal care outside hospital where records show HIV ab test negative during 1st trimester and in July 2020.    No signs of acute HIV infection on exam.    Today HIV 1/2 confirmatory Ab test negative and HIV viral load not detected.    Preliminary positive test likely represented a false positive. No need for antiretroviral prophylaxis.       Yuli Badillo MD  Pager: [phone]  After 5 PM or weekends please call fellow on call or office [phone]

## 2020-08-31 NOTE — PROGRESS NOTE ADULT - SUBJECTIVE AND OBJECTIVE BOX
R3 Antepartum Note: HD#3    Interval events:  None.    Patient seen and examined at bedside, no acute overnight events. Patient endorses contractions since the induction, but they are not painful. Patient is ambulating, tolerating PO, voiding spontaneously. Patient reports +FM. Denies LOF, VB. Denies CP, SOB, N/V, fevers, and chills.    Vital Signs Last 24 Hours  T(C): 36.8 (08-31-20 @ 05:11), Max: 36.9 (08-31-20 @ 01:35)  HR: 67 (08-31-20 @ 09:12) (65 - 96)  BP: 121/62 (08-31-20 @ 09:12) (102/57 - 132/81)  RR: 18 (08-31-20 @ 05:11) (17 - 19)  SpO2: 93% (08-31-20 @ 05:11) (93% - 100%)      Physical Exam:  General: NAD  Abdomen: Soft, non-tender, gravid  Ext: No LE tenderness/edema  NST reactive overnight        Labs:  08-29 @ 16:04    138  |  109  |  7   ----------------------------<  79  3.8   |  18  |  0.55    Ca    8.6      08-29 @ 16:04    TPro  6.3  /  Alb  3.5  /  TBili  < 0.2  /  DBili  x   /  AST  16  /  ALT  8   /  AlkPhos  176  08-29 @ 16:04            MEDICATIONS  (STANDING):  folic acid 1 milliGRAM(s) Oral daily  heparin   Injectable 5000 Unit(s) SubCutaneous every 12 hours  prenatal multivitamin 1 Tablet(s) Oral daily    MEDICATIONS  (PRN):

## 2020-08-31 NOTE — PROGRESS NOTE ADULT - ASSESSMENT
A/P: 19y/o  at 41w1d GA admitted initially for IOL for presumed oligohydramnios, however, repeat US demonstrated a normal SYDNIE. Prenatal labs significant for prelim HIV+, repeat value is the same. IOL was stopped due to a concern for potential active infection and the risk for vertical transmission in the event of a high viral load.    #HIV+ on screening test  -Repeat screen test is positive   -Confirmatory and vrial load sent and received by core lab; lab was called and bobby was placed for the specimen  -Hepatitis panel sent; HepBsAg neg, Hep C ab neg  -Appreciate ID following the patient  -Per the policy, a C/S is indicated if viral load >1000; given unknown viral load, if evidence of fetal distress, active labor, or ROM, will proceed with delivery  -Per discussion with pharmacy, AZT is available if needed    #Fetal well-being   -[facility] Sono (): SYDNIE 10.5, MVP 5.2  -NST BID  -BPP daily    #Maternal well-being   -Utox neg  -Reg diet  -PNV, folic  -F/u quant gold, toxo    Abbey Padilla PGY-3

## 2020-08-31 NOTE — CHART NOTE - NSCHARTNOTEFT_GEN_A_CORE
R1 Labor Note    S: Patient evaluated at bedside for cervical change.     O:  T(C): 36.8 (20 @ 17:56), Max: 36.9 (20 @ 01:35)  HR: 88 (20 @ 20:51) (67 - 101)  BP: 120/63 (20 @ 17:56) (115/65 - 132/81)  RR: 17 (20 @ 17:56) (17 - 19)  SpO2: 99% (20 @ 20:46) (93% - 99%)    SVE: 2/50/-3  EFM: Baseline 120, Moderate Variability, + Accels,- Decels  [place]:  q 8-10 minutes      A/P 20y P0 @ 41wks 1 day IOL  -IOL: Vag Cyto  -Anticipate     d/w Tammy Gonzalez, PGY-4   Lesly Freire PGY-1

## 2020-08-31 NOTE — PROGRESS NOTE ADULT - SUBJECTIVE AND OBJECTIVE BOX
Follow Up:  preliminary HIV test +    Inverval History/ROS:  feels well.  no fever, chills, rash.  confirmatory HIV Ab test negative.    Allergies  No Known Allergies        ANTIMICROBIALS:      OTHER MEDS:  folic acid 1 milliGRAM(s) Oral daily  misoprostol 25 MICROGram(s) Vaginal once  prenatal multivitamin 1 Tablet(s) Oral daily      Vital Signs Last 24 Hrs  T(C): 36.8 (31 Aug 2020 13:38), Max: 36.9 (31 Aug 2020 01:35)  T(F): 98.2 (31 Aug 2020 13:38), Max: 98.4 (31 Aug 2020 01:35)  HR: 85 (31 Aug 2020 13:38) (65 - 96)  BP: 125/77 (31 Aug 2020 13:38) (102/57 - 132/81)  BP(mean): --  RR: 18 (31 Aug 2020 13:38) (18 - 19)  SpO2: 99% (31 Aug 2020 13:38) (93% - 100%)    PHYSICAL EXAM:  General: [x ] non-toxic  HEAD/EYES: [ ] PERRL [x ] white sclera [ ] icterus  ENT:  [ ] normal [x ] supple [ ] thrush [ ] pharyngeal exudate  Cardiovascular:   [ ] murmur [x ] normal [ ] PPM/AICD  Respiratory:  [x ] clear to ausculation bilaterally  GI:  [x ] soft, non-tender, normal bowel sounds, gravid  :  [ ] rankin [ ] no CVA tenderness   Musculoskeletal:  [ ] no synovitis  Neurologic:  [ ] non-focal exam   Skin:  [x ] no rash  Lymph: [x ] no lymphadenopathy  Psychiatric:  [x ] appropriate affect [x ] alert & oriented  Lines:  [ ] no phlebitis [ ] central line                                12.8   12.68 )-----------( 218      ( 31 Aug 2020 13:53 )             37.8     MICROBIOLOGY:    HIV 1/2 AB Confirmation (08.29.20 @ 16:06)    HIV Result: HIV Ab Neg    HIV-1/HIV-2 Interpretation: See Comment HIV 1/2 AB Confirmation and Differentiation Assay  Interpretation:  HIV Antibody NEGATIVE. HIV Antibodies were not confirmed.  The sample was reflexed to HIV-1 RNA testing.  It is recommended that first time reactive specimens be  confirmed by testing a second specimen. For pediatric  patients, maternally transferred HIV antibodies may  persist for up to 15 months after birth, therefore  antibody tests cannot be relied upon for HIV diagnosis.  [facility] offers pediatric HIV diagnostic testing for  infants born to HIV positive mothers. Please contact the  laboratory if such testing is needed. If assistance with  partner notification is needed, please contact your local  department of health or call the [place] HIV/AIDS  Hotline at [phone].  For information on HIV care referral at [facility],  please call: [phone] (HIV adult patients) or  [phone] (HIV pediatric patients up to age 24). For  other HIV care referral resources, please call the [facility] HIV/AIDS Hotline at [phone].    HIV-1 RNA Quantitative, Viral Load (08.29.20 @ 16:06)    HIV-1 Viral Load Result: NOT DET.    HIV-1 RNA Quantitative, Viral Load: NOT DET.: INFCE Result Units: [id number]    HIV-1 RNA Quantitative, Vir Load Interp: See Comment Viral loads lower than 12 copies/ml cannot be detected  reliably. Thus, a Not Detected result does not necessarily  rule out HIV-1 infection. METHOD: Transcription mediated  amplification (TMA) – [employer].  Abbreviation:  NOT DET. and not det. = Not Detected  n/a = not available  .    HIV-1 RNA Quantitative, Viral Load Log: NOT DET.: INFCE Result Units: lg /mL      HIV-1/2 Ag/Ab Combo (08.29.20 @ 16:06)    HIV Combo Result: PRELIM REACT Reactive results are presumptive and will be confirmed  according to a CDC/[place] approved algorithm.  If specimen  quantity is not sufficient, the result must be confirmed by  ordering the HIV1/2 Antigen/Antibody Screen with a new  specimen.            RADIOLOGY:

## 2020-09-01 ENCOUNTER — TRANSCRIPTION ENCOUNTER (OUTPATIENT)
Age: 20
End: 2020-09-01

## 2020-09-01 ENCOUNTER — RESULT REVIEW (OUTPATIENT)
Age: 20
End: 2020-09-01

## 2020-09-01 LAB
GAMMA INTERFERON BACKGROUND BLD IA-ACNC: 0.01 IU/ML — SIGNIFICANT CHANGE UP
M TB IFN-G BLD-IMP: NEGATIVE — SIGNIFICANT CHANGE UP
M TB IFN-G CD4+ BCKGRND COR BLD-ACNC: 0 IU/ML — SIGNIFICANT CHANGE UP
M TB IFN-G CD4+CD8+ BCKGRND COR BLD-ACNC: 0 IU/ML — SIGNIFICANT CHANGE UP
QUANT TB PLUS MITOGEN MINUS NIL: 8.18 IU/ML — SIGNIFICANT CHANGE UP

## 2020-09-01 PROCEDURE — 88307 TISSUE EXAM BY PATHOLOGIST: CPT | Mod: 26

## 2020-09-01 PROCEDURE — 59514 CESAREAN DELIVERY ONLY: CPT | Mod: U9,UB,GC

## 2020-09-01 RX ORDER — OXYCODONE HYDROCHLORIDE 5 MG/1
10 TABLET ORAL
Refills: 0 | Status: DISCONTINUED | OUTPATIENT
Start: 2020-09-01 | End: 2020-09-01

## 2020-09-01 RX ORDER — ACETAMINOPHEN 500 MG
975 TABLET ORAL
Refills: 0 | Status: DISCONTINUED | OUTPATIENT
Start: 2020-09-01 | End: 2020-09-03

## 2020-09-01 RX ORDER — SIMETHICONE 80 MG/1
80 TABLET, CHEWABLE ORAL EVERY 4 HOURS
Refills: 0 | Status: DISCONTINUED | OUTPATIENT
Start: 2020-09-01 | End: 2020-09-03

## 2020-09-01 RX ORDER — NALOXONE HYDROCHLORIDE 4 MG/.1ML
0.1 SPRAY NASAL
Refills: 0 | Status: DISCONTINUED | OUTPATIENT
Start: 2020-09-01 | End: 2020-09-01

## 2020-09-01 RX ORDER — ONDANSETRON 8 MG/1
4 TABLET, FILM COATED ORAL EVERY 6 HOURS
Refills: 0 | Status: DISCONTINUED | OUTPATIENT
Start: 2020-09-01 | End: 2020-09-01

## 2020-09-01 RX ORDER — TETANUS TOXOID, REDUCED DIPHTHERIA TOXOID AND ACELLULAR PERTUSSIS VACCINE, ADSORBED 5; 2.5; 8; 8; 2.5 [IU]/.5ML; [IU]/.5ML; UG/.5ML; UG/.5ML; UG/.5ML
0.5 SUSPENSION INTRAMUSCULAR ONCE
Refills: 0 | Status: DISCONTINUED | OUTPATIENT
Start: 2020-09-01 | End: 2020-09-03

## 2020-09-01 RX ORDER — OXYCODONE HYDROCHLORIDE 5 MG/1
5 TABLET ORAL ONCE
Refills: 0 | Status: DISCONTINUED | OUTPATIENT
Start: 2020-09-01 | End: 2020-09-03

## 2020-09-01 RX ORDER — HYDROMORPHONE HYDROCHLORIDE 2 MG/ML
1 INJECTION INTRAMUSCULAR; INTRAVENOUS; SUBCUTANEOUS
Refills: 0 | Status: DISCONTINUED | OUTPATIENT
Start: 2020-09-01 | End: 2020-09-01

## 2020-09-01 RX ORDER — OXYTOCIN 10 UNIT/ML
333.33 VIAL (ML) INJECTION
Qty: 20 | Refills: 0 | Status: DISCONTINUED | OUTPATIENT
Start: 2020-09-01 | End: 2020-09-01

## 2020-09-01 RX ORDER — DIPHENHYDRAMINE HCL 50 MG
25 CAPSULE ORAL EVERY 6 HOURS
Refills: 0 | Status: DISCONTINUED | OUTPATIENT
Start: 2020-09-01 | End: 2020-09-03

## 2020-09-01 RX ORDER — MORPHINE SULFATE 50 MG/1
3 CAPSULE, EXTENDED RELEASE ORAL ONCE
Refills: 0 | Status: DISCONTINUED | OUTPATIENT
Start: 2020-09-01 | End: 2020-09-01

## 2020-09-01 RX ORDER — ACETAMINOPHEN 500 MG
3 TABLET ORAL
Qty: 0 | Refills: 0 | DISCHARGE
Start: 2020-09-01

## 2020-09-01 RX ORDER — KETOROLAC TROMETHAMINE 30 MG/ML
30 SYRINGE (ML) INJECTION EVERY 6 HOURS
Refills: 0 | Status: DISCONTINUED | OUTPATIENT
Start: 2020-09-01 | End: 2020-09-01

## 2020-09-01 RX ORDER — IBUPROFEN 200 MG
600 TABLET ORAL EVERY 6 HOURS
Refills: 0 | Status: COMPLETED | OUTPATIENT
Start: 2020-09-01 | End: 2021-07-31

## 2020-09-01 RX ORDER — SODIUM CHLORIDE 9 MG/ML
1000 INJECTION INTRAMUSCULAR; INTRAVENOUS; SUBCUTANEOUS
Refills: 0 | Status: DISCONTINUED | OUTPATIENT
Start: 2020-09-01 | End: 2020-09-01

## 2020-09-01 RX ORDER — MAGNESIUM HYDROXIDE 400 MG/1
30 TABLET, CHEWABLE ORAL
Refills: 0 | Status: DISCONTINUED | OUTPATIENT
Start: 2020-09-01 | End: 2020-09-03

## 2020-09-01 RX ORDER — OXYTOCIN 10 UNIT/ML
2 VIAL (ML) INJECTION
Qty: 30 | Refills: 0 | Status: DISCONTINUED | OUTPATIENT
Start: 2020-09-01 | End: 2020-09-01

## 2020-09-01 RX ORDER — OXYCODONE HYDROCHLORIDE 5 MG/1
5 TABLET ORAL
Refills: 0 | Status: DISCONTINUED | OUTPATIENT
Start: 2020-09-01 | End: 2020-09-03

## 2020-09-01 RX ORDER — HEPARIN SODIUM 5000 [USP'U]/ML
5000 INJECTION INTRAVENOUS; SUBCUTANEOUS EVERY 12 HOURS
Refills: 0 | Status: DISCONTINUED | OUTPATIENT
Start: 2020-09-01 | End: 2020-09-03

## 2020-09-01 RX ORDER — SODIUM CHLORIDE 9 MG/ML
500 INJECTION, SOLUTION INTRAVENOUS ONCE
Refills: 0 | Status: COMPLETED | OUTPATIENT
Start: 2020-09-01 | End: 2020-09-01

## 2020-09-01 RX ORDER — OXYCODONE HYDROCHLORIDE 5 MG/1
5 TABLET ORAL
Refills: 0 | Status: DISCONTINUED | OUTPATIENT
Start: 2020-09-01 | End: 2020-09-01

## 2020-09-01 RX ORDER — SODIUM CHLORIDE 9 MG/ML
1000 INJECTION, SOLUTION INTRAVENOUS
Refills: 0 | Status: DISCONTINUED | OUTPATIENT
Start: 2020-09-01 | End: 2020-09-02

## 2020-09-01 RX ORDER — OXYCODONE HYDROCHLORIDE 5 MG/1
5 TABLET ORAL ONCE
Refills: 0 | Status: DISCONTINUED | OUTPATIENT
Start: 2020-09-01 | End: 2020-09-01

## 2020-09-01 RX ORDER — IBUPROFEN 200 MG
1 TABLET ORAL
Qty: 0 | Refills: 0 | DISCHARGE
Start: 2020-09-01

## 2020-09-01 RX ORDER — IBUPROFEN 200 MG
600 TABLET ORAL EVERY 6 HOURS
Refills: 0 | Status: DISCONTINUED | OUTPATIENT
Start: 2020-09-01 | End: 2020-09-03

## 2020-09-01 RX ORDER — LANOLIN
1 OINTMENT (GRAM) TOPICAL EVERY 6 HOURS
Refills: 0 | Status: DISCONTINUED | OUTPATIENT
Start: 2020-09-01 | End: 2020-09-03

## 2020-09-01 RX ORDER — BUTORPHANOL TARTRATE 2 MG/ML
0.12 INJECTION, SOLUTION INTRAMUSCULAR; INTRAVENOUS EVERY 6 HOURS
Refills: 0 | Status: DISCONTINUED | OUTPATIENT
Start: 2020-09-01 | End: 2020-09-01

## 2020-09-01 RX ADMIN — SODIUM CHLORIDE 1000 MILLILITER(S): 9 INJECTION, SOLUTION INTRAVENOUS at 10:45

## 2020-09-01 RX ADMIN — SODIUM CHLORIDE 125 MILLILITER(S): 9 INJECTION, SOLUTION INTRAVENOUS at 10:25

## 2020-09-01 RX ADMIN — Medication 975 MILLIGRAM(S): at 21:40

## 2020-09-01 RX ADMIN — HEPARIN SODIUM 5000 UNIT(S): 5000 INJECTION INTRAVENOUS; SUBCUTANEOUS at 17:00

## 2020-09-01 RX ADMIN — Medication 2 MILLIUNIT(S)/MIN: at 04:30

## 2020-09-01 RX ADMIN — Medication 975 MILLIGRAM(S): at 20:50

## 2020-09-01 RX ADMIN — Medication 30 MILLIGRAM(S): at 17:00

## 2020-09-01 NOTE — CHART NOTE - NSCHARTNOTEFT_GEN_A_CORE
R1 Labor Note    S: Patient evaluated at bedside for cervical change.     O:  T(C): 36.8 (20 @ 01:09), Max: 36.8 (20 @ 05:11)  HR: 63 (20 @ 02:54) (63 - 101)  BP: 134/71 (20 @ 02:54) (108/63 - 136/69)  RR: 17 (20 @ 01:09) (17 - 18)  SpO2: 98% (20 @ 02:54) (93% - 100%)    SVE: 2/70/-3  EFM: Baseline 110, Moderate Variability, + Accelerations, -Decels  [place]:  q 4-5 minutes    A/P 20y P0 @ 41wks 1 day IOL  -IOL: Start on Pitocin  -Analgesia: Epidural called for   -Anticipate     d/w Tammy Gonzalez, PGY-4  Lesly Freire PGY-1

## 2020-09-01 NOTE — CHART NOTE - NSCHARTNOTEFT_GEN_A_CORE
R1 Labor Note    S: Patient evaluated at bedside for cervical change.     O:  T(C): 36.8 (20 @ 02:30), Max: 36.8 (20 @ 13:38)  HR: 91 (20 @ 07:11) (62 - 105)  BP: 139/71 (20 @ 07:06) (108/63 - 166/127)  RR: 17 (20 @ 01:09) (17 - 18)  SpO2: 99% (20 @ 07:11) (94% - 100%)    SVE: 4/80/-3  EFM: Baseline 110, Moderate Variability, + Accels, - Decels  [place]:  q 4 minutes    A/P 20y P0 @ 41wks 2 days IOL   - Start Pitocin  -SROM (light mec)  -Anticipate     d/w Dr. Jenny Freire PGY-1

## 2020-09-01 NOTE — OB RN DELIVERY SUMMARY - NS_SEPSISRSKCALC_OBGYN_ALL_OB_FT
EOS calculated successfully. EOS Risk Factor: 0.5/1000 live births ([facility] national incidence); GA=41w2d; Temp=99; ROM=3.467; GBS='Unknown'; Antibiotics='No antibiotics or any antibiotics < 2 hrs prior to birth'

## 2020-09-01 NOTE — DISCHARGE NOTE OB - HOSPITAL COURSE
Patient had an uncomplicated low transverse  section for NRFHT. Please see operative note for full details. Pt had a preliminary reactive HIV test and confirmatory tests were negative.  EBL: 500                        12.8   12.68 )-----------( 218      ( 31 Aug 2020 13:53 )             37.8     During postpartum course, the patient's vitals were stable, vaginal bleeding appropriate, and pain well controlled. Post operative day 1, the hematocrit was appropriate. On day of discharge the patient was ambulating, had adequate oral intake, voiding freely, and with appropriate pain control.   Discharge instructions and precautions provided. Return to clinic in 2 weeks for an incision check and in 6 weeks for postpartum visit.

## 2020-09-01 NOTE — OB NEONATOLOGY/PEDIATRICIAN DELIVERY SUMMARY - NSPEDSNEONOTESA_OBGYN_ALL_OB_FT
Baby is a 40.6 week GA female born to a 23 y/o  mother via . Maternal history depression, previous fetal demise at 20wk in 2019, SAB x1. Pregnancy notable for oligo, SYDNIE 10.5. Maternal blood type O+. Prenatal labs HepB negative, rubella immune. RPR pending. Preliminary HIV reactive. GBS unknown. No labor, ROM at delivery with clear fluid. Baby born with poor tone and poor respiratory effort. Received PPV for 30 seconds, began breathing spontaneously and satting well. Transitioned to RA easily. Warmed, dried, stimulated. Apgars 5/9. EOS not indicated. Mom plans to breastfeed/bottlefeed and consents hepB. ?circ.

## 2020-09-01 NOTE — DISCHARGE NOTE OB - PATIENT PORTAL LINK FT
You can access the FollowMyHealth Patient Portal offered by [facility] by registering at the following website: [email or web address]. By joining [employer]’s FollowMyHealth portal, you will also be able to view your health information using other applications (apps) compatible with our system.

## 2020-09-01 NOTE — DISCHARGE NOTE OB - COMMUNITY RESOURCE NAME:
Patient instructed to make a postpartum follow up appointment at the Ambulatory Care Unit at [facility]  2-3 weeks after her delivery date. Patient also instructed to make follow up appointment for the baby at [facility], Division of General Pediatrics  1-2 days after discharge.

## 2020-09-01 NOTE — CHART NOTE - NSCHARTNOTEFT_GEN_A_CORE
Event note    I was called to the room at 8:57a for a patient noted to have a fetal bradycardia  On entering the room the fetal heart was audibly low but not being traced on CPM by the external monitor  The patient was examined by Dr Padilla (PGY 3)  and noted to be 6cm  FSE was requested by me at 8:58a and placed by Dr Padilla, however the cable for the FSE but not readily available  FHR was still audibly low and patient was noted to have a 1 contraction per minute  Terbutaline requested by me  and administered by the RN  Patient was already with O2  IVF fluids was open wide as per Anesthesiologist Dr Naranjo  /67  The FHR was still not being traced but audibly low and the decision was made to proceed with  delivery  I informed the mother and requested verbal consent  The mother responsed "I do not want a csection I want you to continue trying to get the heart rate up"  The cable for the FSE became available at 9:08am and the FHR was noted to be approx 85bpm.    The mother was notified that our resuscitative efforts were not improving the fetus' heart rate and verbal consent for a csection was requested again.  The mother gave consent to me to proceed with  delivery and the patient was moved expeditiously to the OR at 9:09am .    See the Operative note for intraoperative details    SHANDRA Perdomo MD, SESAR, FACOG Event note    I was called to the room at 8:57a for a patient noted to have a fetal bradycardia  On entering the room the fetal heart was audibly low but not being traced on CPM by the external monitor  The patient was examined by Dr Padilla (PGY 3)  and noted to be 6cm  FSE was requested by me at 8:58a and placed by Dr Padilla, however the cable for the FSE but not readily available  FHR was still audibly low and patient was noted to have a 1 contraction per minute  Terbutaline requested by me  and administered by the RN  Patient was already with O2  IVF fluids was open wide as per Anesthesiologist Dr Naranjo  /67  The FHR was still not being traced but audibly low and the decision was made to proceed with  delivery  I informed the mother and requested verbal consent  The mother responded "I do not want a csection I want you to continue trying to get the heart rate up"  Resuscitation continued however the FHR was still audibly low  I expressed my concern to thte mother and asked her to listen closely to the baby's heart rate which was not normal  The mother was notified that our resuscitative efforts were not improving the fetus' heart rate and verbal consent for a csection was requested again.    The mother gave consent to me to proceed with  delivery and the patient was moved expeditiously to the OR at 9:09am .   The cable for the FSE became available at 9:08am and the FHR was noted to be approx 85bpm.       See the Operative note for intraoperative details    SHANDRA Perdomo MD, SESAR, FACOG Event note    I was called to the room at 8:57a for a patient noted to have a fetal bradycardia  On entering the room the fetal heart was audibly low but not being traced on CPM by the external monitor  The patient was examined by Dr Padilla (PGY 3)  and noted to be 6cm  FSE was requested by me at 8:58a and placed by Dr Padilla, however the cable for the FSE but not readily available  FHR was still audibly low and patient was noted to have a 1 contraction per minute  Terbutaline requested by me  and administered by the RN  Patient was already with O2  IVF fluids was open wide as per Anesthesiologist Dr Banda  /67  The FHR was still not being traced but audibly low and the decision was made to proceed with  delivery at 9:01  I informed the mother and requested verbal consent  The mother responded "I do not want a csection. I want you to continue trying to get the heart rate up"  Resuscitation continued however the FHR was still audibly low  I expressed my concern to the mother and asked her to listen closely to the baby's heart rate which was not normal  The mother was notified that our resuscitative efforts were not improving the fetus' heart rate and verbal consent for a csection was requested again.    The mother gave consent to me to proceed with  delivery at and the patient was moved expeditiously to the OR   The cable for the FSE became available at 9:08am and the FHR was noted to be approx 85bpm.       See the Operative note for intraoperative details    SHANDRA Perdomo MD, SESAR, FACOG

## 2020-09-01 NOTE — CHART NOTE - NSCHARTNOTEFT_GEN_A_CORE
Patient noted to have BP 80s/60s in the PACU immediately postop from LTCS. Patient asymptomatic, denying lightheadednes/dizziness, fevers/chills, cp/sob. UOP during the c/s was 500cc, and she received 1200cc IVF intraop. QBL was 196.    PE:  Constitutional: NAD  CV: RRR  Resp: CTABL  Abd: appropriately tender, no expression of clots   Ext: nonerythematous, trace edema bilaterally    Assessment: POD0 s/p LTCS  - Administer 500cc bolus of LR   - Continue to monitor BPs     D/w Dr. Conor Shell, PGY1

## 2020-09-01 NOTE — DISCHARGE NOTE OB - ADDITIONAL INSTRUCTIONS
Make your follow-up appointment with your doctor as ordered. No heavy lifting, driving, or strenuous activity for 6 weeks. Nothing per vagina such as tampons, intercourse, douches or tub baths for 6 weeks or until you see your doctor. Call your doctor with any signs and symptoms of infection such as fever, chills, nausea or vomiting. Call your doctor with redness or swelling at the incision site, fluid leakage or wound separation. Call your doctor if you’re unable to tolerate food, increase in vaginal bleeding, or have difficulty urinating. Call your doctor if you have pain that is not relieved by your prescribed medications. Notify your doctor with any other concerns. Call [phone] if you have any of these concerns in the next 6 weeks.    Please schedule appointments to see us in the Ob/Gyn clinic.   Please schedule one appointment TWO WEEKS from discharge for a post operative incision check.   Please schedule another appointment SIX WEEKS from discharge for a routine post partum visit.

## 2020-09-01 NOTE — DISCHARGE NOTE OB - CARE PLAN
Principal Discharge DX:	 delivery delivered  Goal:	Full recovery  Assessment and plan of treatment:	s/p LTCS stable during the postpartum period for discharge

## 2020-09-01 NOTE — DISCHARGE NOTE OB - PROVIDER TOKENS
FREE:[LAST:[FRANKLYN PACHECO],PHONE:[phone]],FAX:[(   [phone]    -],ADDRESS:[Ambulatory CLinic Unit, oncology [facility]  [address], [place] [address]]]

## 2020-09-01 NOTE — CHART NOTE - NSCHARTNOTEFT_GEN_A_CORE
R2 OB Labor Note    S: Patient seen and examined at bedside.     Vital Signs Last 24 Hrs  T(C): 36.8 (01 Sep 2020 02:30), Max: 36.8 (31 Aug 2020 13:38)  T(F): 98.24 (01 Sep 2020 02:30), Max: 98.3 (31 Aug 2020 17:56)  HR: 70 (01 Sep 2020 06:21) (62 - 101)  BP: 125/66 (01 Sep 2020 06:21) (108/63 - 166/127)  BP(mean): --  RR: 17 (01 Sep 2020 01:09) (17 - 18)  SpO2: 100% (01 Sep 2020 06:21) (94% - 100%)    FHT: 120, mod bryant, +accels, + variable decels  [place]: q2-3m  SVE: 5/80/-2    A/P:   - Labor: pit paused, IUPC placed, start AI  - Fetus: cat II  - Pain: epidural in place    ADomney PGY-2  d/w Dr. Childress

## 2020-09-01 NOTE — OB PROVIDER DELIVERY SUMMARY - NSPROVIDERDELIVERYNOTE_OBGYN_ALL_OB_FT
Pre-operative Diagnosis: Non reassuring fetal heart tracing/Fetal Bradycardia  Post-operative Diagnosis: Same   Gestational Age: 41w2d  Surgeon: Dr Belkys Perdomo   Assistants: Dr Abbey Padilla (PGY 3)  Anesthesiologist: Dr Quintana  Anesthesia: Epidural  Pre-Op Abx: Ancef 2gm, Azithromycin 500mg   Fetal Heart Beat Pre-Op in OR: 80s bpm  Ureterotonics given: Pitocin  Estimated Blood Loss:  500cc  Quantified blood loss: 196cc   Drains: [place] to gravity   Total IV Fluids: 1200cc   Urine Output (intra-op): 500cc    Findings: Female  in occiput posterior position. Apgars 5,9 2860g     Normal tubes and ovaries, placenta with < 25% abruption    Complications  None, patient tolerated the procedure well              Procedure Details    See operative note

## 2020-09-01 NOTE — OB RN DELIVERY SUMMARY - NS_LABORCHARACTER_OBGYN_ALL_OB
Induction of labor-AROM/Induction of labor-Medicinal/Augmentation of labor/Internal electronic FM/External electronic FM

## 2020-09-01 NOTE — DISCHARGE NOTE OB - CARE PROVIDER_API CALL
FRANKLYN PACHECO,   Ambulatory CLinic Unit, oncology [address] [place] [address]  Phone: [phone]  Fax: (   )    -  Follow Up Time:

## 2020-09-02 LAB
BASOPHILS # BLD AUTO: 0.04 K/UL — SIGNIFICANT CHANGE UP (ref 0–0.2)
BASOPHILS NFR BLD AUTO: 0.2 % — SIGNIFICANT CHANGE UP (ref 0–2)
EOSINOPHIL # BLD AUTO: 0.17 K/UL — SIGNIFICANT CHANGE UP (ref 0–0.5)
EOSINOPHIL NFR BLD AUTO: 0.9 % — SIGNIFICANT CHANGE UP (ref 0–6)
HCT VFR BLD CALC: 33.2 % — LOW (ref 34.5–45)
HGB BLD-MCNC: 11 G/DL — LOW (ref 11.5–15.5)
IMM GRANULOCYTES NFR BLD AUTO: 0.5 % — SIGNIFICANT CHANGE UP (ref 0–1.5)
LYMPHOCYTES # BLD AUTO: 21.9 % — SIGNIFICANT CHANGE UP (ref 13–44)
LYMPHOCYTES # BLD AUTO: 4.26 K/UL — HIGH (ref 1–3.3)
MCHC RBC-ENTMCNC: 30.3 PG — SIGNIFICANT CHANGE UP (ref 27–34)
MCHC RBC-ENTMCNC: 33.1 % — SIGNIFICANT CHANGE UP (ref 32–36)
MCV RBC AUTO: 91.5 FL — SIGNIFICANT CHANGE UP (ref 80–100)
MONOCYTES # BLD AUTO: 0.89 K/UL — SIGNIFICANT CHANGE UP (ref 0–0.9)
MONOCYTES NFR BLD AUTO: 4.6 % — SIGNIFICANT CHANGE UP (ref 2–14)
NEUTROPHILS # BLD AUTO: 14 K/UL — HIGH (ref 1.8–7.4)
NEUTROPHILS NFR BLD AUTO: 71.9 % — SIGNIFICANT CHANGE UP (ref 43–77)
NRBC # FLD: 0 K/UL — SIGNIFICANT CHANGE UP (ref 0–0)
PLATELET # BLD AUTO: 219 K/UL — SIGNIFICANT CHANGE UP (ref 150–400)
PMV BLD: 11.2 FL — SIGNIFICANT CHANGE UP (ref 7–13)
RBC # BLD: 3.63 M/UL — LOW (ref 3.8–5.2)
RBC # FLD: 14.4 % — SIGNIFICANT CHANGE UP (ref 10.3–14.5)
WBC # BLD: 19.45 K/UL — HIGH (ref 3.8–10.5)
WBC # FLD AUTO: 19.45 K/UL — HIGH (ref 3.8–10.5)

## 2020-09-02 RX ORDER — FERROUS SULFATE 325(65) MG
325 TABLET ORAL DAILY
Refills: 0 | Status: DISCONTINUED | OUTPATIENT
Start: 2020-09-02 | End: 2020-09-03

## 2020-09-02 RX ORDER — SENNA PLUS 8.6 MG/1
1 TABLET ORAL
Refills: 0 | Status: DISCONTINUED | OUTPATIENT
Start: 2020-09-02 | End: 2020-09-03

## 2020-09-02 RX ADMIN — Medication 600 MILLIGRAM(S): at 06:00

## 2020-09-02 RX ADMIN — Medication 600 MILLIGRAM(S): at 06:45

## 2020-09-02 RX ADMIN — Medication 600 MILLIGRAM(S): at 12:09

## 2020-09-02 RX ADMIN — Medication 600 MILLIGRAM(S): at 18:15

## 2020-09-02 RX ADMIN — HEPARIN SODIUM 5000 UNIT(S): 5000 INJECTION INTRAVENOUS; SUBCUTANEOUS at 05:00

## 2020-09-02 RX ADMIN — Medication 600 MILLIGRAM(S): at 12:47

## 2020-09-02 RX ADMIN — Medication 600 MILLIGRAM(S): at 00:45

## 2020-09-02 RX ADMIN — Medication 975 MILLIGRAM(S): at 21:20

## 2020-09-02 RX ADMIN — Medication 975 MILLIGRAM(S): at 22:15

## 2020-09-02 RX ADMIN — Medication 975 MILLIGRAM(S): at 03:00

## 2020-09-02 RX ADMIN — Medication 600 MILLIGRAM(S): at 00:05

## 2020-09-02 RX ADMIN — Medication 975 MILLIGRAM(S): at 03:45

## 2020-09-02 RX ADMIN — Medication 600 MILLIGRAM(S): at 17:50

## 2020-09-02 RX ADMIN — HEPARIN SODIUM 5000 UNIT(S): 5000 INJECTION INTRAVENOUS; SUBCUTANEOUS at 17:51

## 2020-09-02 NOTE — PROGRESS NOTE ADULT - PROBLEM SELECTOR PLAN 1
- Continue regular diet.  - Increase ambulation.  - Continue motrin, tylenol, oxycodone PRN for pain control.      Kate Shell MD PGY1 - f/u psych consult for h/o depression w/ admission to [facility] in 2/2020.  - Continue regular diet.  - Increase ambulation.  - Continue motrin, tylenol, oxycodone PRN for pain control.      Kate Shell MD PGY1

## 2020-09-02 NOTE — BEHAVIORAL HEALTH ASSESSMENT NOTE - HPI (INCLUDE ILLNESS QUALITY, SEVERITY, DURATION, TIMING, CONTEXT, MODIFYING FACTORS, ASSOCIATED SIGNS AND SYMPTOMS)
20 year old  Female, PPH of depression/cluster B personality d/o and 1x prior psych admission 2020 for SI, PMH minimal, now s/p deliviery of  full term. Psych c/s for history of psych hospitalization and SI though no current symptoms noted.     Patient evaluated with and without spouse - calm and cooperative, aox4. States that she was in a  "bad place" back in 2020, feels that psychiatric hospitalization greatly helped, in particular she did DBT groups and left with a handbook of coping strategies for anger/depression that she still employs today. Denies recent depression or anhedonia since her psych hospitalization, denies changes in mood/appetite/sleep. States she stopped her zoloft in part because she could not find an appointment for herself with  psych and also because she became worried about the effects the meds would have on her baby. Denies decreased need for sleep, increased energy, racing thoughts, irritability euphoria or grandiosity. Denies AH/VH delusions or paranoia. Denies EtOH or drug abuse. Denies SI/I/P or HI/I/P currently.     Able to cite specific coping mechanisms for numerous hypothetical stressful scenarios when challenged. Agrees to safety plan of calling friends/family and escalating help seeking behaviors if situation worsens.    Spoke to patient's spouse /FOB Rafat - denies safety concerns feels him and patients are in a "good place" and are working together, denies SI/HI, features of psychosis, depression, or EtOH/drug abuse.     Together patient and spouse state they have a plan for childcare, Rafat will be home to help as his job has allowed him time and Patient feels well supported by this. Patient amenable to outpt f/u with  Psych, would prefer talk therapy to meds. No thoughts to harm child, both parents appear appropriately hopeful for child (named Roldan) wishes to "give her the life that I didn't have" , wishes for daughter to go to college.     Prior records and CMV reviewed - patient did well with DBT in past.

## 2020-09-02 NOTE — BEHAVIORAL HEALTH ASSESSMENT NOTE - SUMMARY
20 year old  Female, PPH of depression/cluster B personality d/o and 1x prior psych admission 2020 for SI, PMH minimal, now s/p deliviery of  full term. Psych c/s for history of psych hospitalization and SI though no current symptoms noted.     Patient presents without features of mood d/o, psychosis, or substance abuse, no current psychiatric overtones noted. No SI/I/P or HI/I/P, safety corroborated by spouse. Patient amenable to outpt f/u with  psych agrees to call to secure intake (every )

## 2020-09-02 NOTE — BEHAVIORAL HEALTH ASSESSMENT NOTE - SUICIDE PROTECTIVE FACTORS
Responsibility to family and others/Identifies reasons for living/Supportive social network of family or friends/Fear of death or the actual act of killing self/Cultural, spiritual and/or moral attitudes against suicide

## 2020-09-02 NOTE — PROGRESS NOTE ADULT - SUBJECTIVE AND OBJECTIVE BOX
OB Progress Note:  Delivery, POD#1    S: 21yo POD#1 s/p LTCS. Her pain is well controlled. She is tolerating a regular diet and passing flatus. Denies N/V. Denies CP/SOB/lightheadedness/dizziness.   She is ambulating without difficulty.   Voiding spontaneously.     O:   Vital Signs Last 24 Hrs  T(C): 36.4 (02 Sep 2020 06:18), Max: 36.9 (01 Sep 2020 17:50)  T(F): 97.5 (02 Sep 2020 06:18), Max: 98.5 (01 Sep 2020 17:50)  HR: 82 (02 Sep 2020 06:18) (66 - 107)  BP: 110/60 (02 Sep 2020 06:18) (78/64 - 139/75)  BP(mean): 83 (01 Sep 2020 15:00) (43 - 83)  RR: 18 (02 Sep 2020 06:18) (13 - 23)  SpO2: 100% (02 Sep 2020 06:18) (90% - 100%)    Labs:  Blood type: O Positive  Rubella IgG: Positive ( @ 11:30)  RPR: Negative                          12.8   12.68<H> >-----------< 218    (  @ 13:53 )             37.8    PE:  General: NAD  Abdomen: Mildly distended, appropriately tender, incision c/d/i.  Extremities: No erythema, no pitting edema OB Progress Note:  Delivery, POD#1    S: 19yo POD#1 s/p LTCS 2/2 fetal bradycardia. Today, her pain is well controlled. She is tolerating a regular diet and passing flatus. Denies N/V. Denies CP/SOB/lightheadedness/dizziness.   She is ambulating without difficulty.   Voiding spontaneously.     O:   Vital Signs Last 24 Hrs  T(C): 36.4 (02 Sep 2020 06:18), Max: 36.9 (01 Sep 2020 17:50)  T(F): 97.5 (02 Sep 2020 06:18), Max: 98.5 (01 Sep 2020 17:50)  HR: 82 (02 Sep 2020 06:18) (66 - 107)  BP: 110/60 (02 Sep 2020 06:18) (78/64 - 139/75)  BP(mean): 83 (01 Sep 2020 15:00) (43 - 83)  RR: 18 (02 Sep 2020 06:18) (13 - 23)  SpO2: 100% (02 Sep 2020 06:18) (90% - 100%)    Labs:  Blood type: O Positive  Rubella IgG: Positive ( @ 11:30)  RPR: Negative                          12.8   12.68<H> >-----------< 218    (  @ 13:53 )             37.8    PE:  General: NAD  Abdomen: Mildly distended, appropriately tender, incision c/d/i.  Extremities: No erythema, no pitting edema

## 2020-09-02 NOTE — PROGRESS NOTE ADULT - ASSESSMENT
A/P: 19yo POD#1 s/p LTCS.  Patient is stable and doing well post-operatively. A/P: 19yo POD#1 s/p LTCS 2/2 fetal bradycardia.  Patient is stable and doing well post-operatively.

## 2020-09-02 NOTE — BEHAVIORAL HEALTH ASSESSMENT NOTE - NSBHCONSULTFOLLOWDETAILS_PSY_A_CORE FT
Patient is here for a complete exam. She denies any issues at this time.  Medication verified, no changes  Denies known Latex allergy or symptoms of Latex sensitivity  Patient is nonfasting  Labs: TSH 2/20/2020  Last CPE: 1/14/2019  Last Pap: 2015 negative HPV  Last Mammogram: 12/19/2016 WNL, Bi-rads 2 - Benign  Last Td: 8/24/2020  Patient states refills not needed today    Verified and loaded patient's preferred pharmacy.  Current Outpatient Medications   Medication Sig Dispense Refill   • metformin (GLUCOPHAGE) 1000 MG tablet TAKE 1 TABLET BY MOUTH TWICE DAILY 60 tablet 0   • triamcinolone (KENALOG) 0.5 % cream APPLY EXTERNALLY TO THE AFFECTED AREA TWICE DAILY AS NEEDED FOR FLARE UP OR PSORIASIS 30 g 0   • levothyroxine (SYNTHROID, LEVOTHROID) 88 MCG tablet TAKE 1 TABLET BY MOUTH DAILY 90 tablet 0   • clobetasol (TEMOVATE) 0.05 % topical solution APPLY EXTERNALLY TO THE AFFECTED AREA DAILY AS NEEDED 50 mL 5   • clobetasol (TEMOVATE) 0.05 % topical solution APPLY EXTERNALLY TO THE AFFECTED AREA DAILY AS NEEDED FOR PSORIASIS 50 mL 0   • nalTREXone (REVIA) 50 MG tablet Take 50 mg by mouth daily.     • doxycycline monohydrate (ADOXA) 100 MG tablet Take 1 tablet by mouth daily as needed (rosacea). 30 tablet 3   • Evening Primrose Oil 1000 MG Cap Take 1 capsule by mouth daily.     • Cholecalciferol (VITAMIN D) 2000 UNITS tablet Take 2000 u Vit D3 daily 1 tablet 0     No current facility-administered medications for this visit.        Health Maintenance Due   Topic Date Due   • Breast Cancer Screening  12/19/2017   • Influenza Vaccine (1) 09/01/2019   • Depression Screening  01/14/2020       Patient is due for topics as listed above but is not proceeding with Immunization(s) Influenza and Mammogram at this time. Education provided for Immunization(s) Influenza and Mammogram.    Patient would like communication of their results via:   Cell Phone:   Telephone Information:   Mobile [phone]     Okay to leave a message  containing results? Yes           Should f/u with [facility]  Psych on

## 2020-09-02 NOTE — BEHAVIORAL HEALTH ASSESSMENT NOTE - RISK ASSESSMENT
Low Acute Suicide Risk chronically elevated risk due to history, minimal features of current risk mitigated by future orientation, good social support, lack of SI/HI, now dependant minor, help seeking behavior

## 2020-09-02 NOTE — BEHAVIORAL HEALTH ASSESSMENT NOTE - NSBHCHARTREVIEWVS_PSY_A_CORE FT
ICU Vital Signs Last 24 Hrs  T(C): 36.8 (02 Sep 2020 14:00), Max: 36.9 (01 Sep 2020 17:50)  T(F): 98.2 (02 Sep 2020 14:00), Max: 98.5 (01 Sep 2020 17:50)  HR: 77 (02 Sep 2020 14:00) (77 - 89)  BP: 117/61 (02 Sep 2020 14:00) (110/60 - 125/48)  BP(mean): --  ABP: --  ABP(mean): --  RR: 18 (02 Sep 2020 14:00) (17 - 18)  SpO2: 100% (02 Sep 2020 14:00) (98% - 100%)

## 2020-09-03 VITALS
DIASTOLIC BLOOD PRESSURE: 65 MMHG | RESPIRATION RATE: 16 BRPM | SYSTOLIC BLOOD PRESSURE: 122 MMHG | OXYGEN SATURATION: 100 % | TEMPERATURE: 98 F | HEART RATE: 77 BPM

## 2020-09-03 LAB
BASOPHILS # BLD AUTO: 0.03 K/UL — SIGNIFICANT CHANGE UP (ref 0–0.2)
BASOPHILS NFR BLD AUTO: 0.2 % — SIGNIFICANT CHANGE UP (ref 0–2)
EOSINOPHIL # BLD AUTO: 0.15 K/UL — SIGNIFICANT CHANGE UP (ref 0–0.5)
EOSINOPHIL NFR BLD AUTO: 0.9 % — SIGNIFICANT CHANGE UP (ref 0–6)
HCT VFR BLD CALC: 31.4 % — LOW (ref 34.5–45)
HGB BLD-MCNC: 10.3 G/DL — LOW (ref 11.5–15.5)
IMM GRANULOCYTES NFR BLD AUTO: 0.7 % — SIGNIFICANT CHANGE UP (ref 0–1.5)
LYMPHOCYTES # BLD AUTO: 23.9 % — SIGNIFICANT CHANGE UP (ref 13–44)
LYMPHOCYTES # BLD AUTO: 3.86 K/UL — HIGH (ref 1–3.3)
MCHC RBC-ENTMCNC: 30.4 PG — SIGNIFICANT CHANGE UP (ref 27–34)
MCHC RBC-ENTMCNC: 32.8 % — SIGNIFICANT CHANGE UP (ref 32–36)
MCV RBC AUTO: 92.6 FL — SIGNIFICANT CHANGE UP (ref 80–100)
MONOCYTES # BLD AUTO: 0.75 K/UL — SIGNIFICANT CHANGE UP (ref 0–0.9)
MONOCYTES NFR BLD AUTO: 4.6 % — SIGNIFICANT CHANGE UP (ref 2–14)
NEUTROPHILS # BLD AUTO: 11.24 K/UL — HIGH (ref 1.8–7.4)
NEUTROPHILS NFR BLD AUTO: 69.7 % — SIGNIFICANT CHANGE UP (ref 43–77)
NRBC # FLD: 0 K/UL — SIGNIFICANT CHANGE UP (ref 0–0)
PLATELET # BLD AUTO: 213 K/UL — SIGNIFICANT CHANGE UP (ref 150–400)
PMV BLD: 10.9 FL — SIGNIFICANT CHANGE UP (ref 7–13)
RBC # BLD: 3.39 M/UL — LOW (ref 3.8–5.2)
RBC # FLD: 14.5 % — SIGNIFICANT CHANGE UP (ref 10.3–14.5)
WBC # BLD: 16.14 K/UL — HIGH (ref 3.8–10.5)
WBC # FLD AUTO: 16.14 K/UL — HIGH (ref 3.8–10.5)

## 2020-09-03 PROCEDURE — 99223 1ST HOSP IP/OBS HIGH 75: CPT

## 2020-09-03 RX ADMIN — Medication 975 MILLIGRAM(S): at 12:00

## 2020-09-03 RX ADMIN — HEPARIN SODIUM 5000 UNIT(S): 5000 INJECTION INTRAVENOUS; SUBCUTANEOUS at 05:45

## 2020-09-03 RX ADMIN — Medication 975 MILLIGRAM(S): at 06:15

## 2020-09-03 RX ADMIN — Medication 325 MILLIGRAM(S): at 12:00

## 2020-09-03 RX ADMIN — Medication 975 MILLIGRAM(S): at 12:39

## 2020-09-03 RX ADMIN — Medication 600 MILLIGRAM(S): at 01:07

## 2020-09-03 RX ADMIN — Medication 975 MILLIGRAM(S): at 05:45

## 2020-09-03 RX ADMIN — Medication 600 MILLIGRAM(S): at 01:45

## 2020-09-03 RX ADMIN — Medication 1 TABLET(S): at 12:00

## 2020-09-03 NOTE — PROGRESS NOTE ADULT - SUBJECTIVE AND OBJECTIVE BOX
OB Progress Note: LTCS, POD#2    S: 19yo POD#2 s/p pLTCS. Pain is well controlled. She is tolerating a regular diet and passing flatus. She is voiding spontaneously, and ambulating without difficulty. Endorses light vaginal bleeding, soaking <1 pad/hr. Denies CP/SOB. Denies lightheadedness/dizziness. Denies N/V.    O:  Vitals:  Vital Signs Last 24 Hrs  T(C): 36.5 (03 Sep 2020 05:40), Max: 36.9 (02 Sep 2020 10:00)  T(F): 97.7 (03 Sep 2020 05:40), Max: 98.4 (02 Sep 2020 10:00)  HR: 74 (03 Sep 2020 05:40) (74 - 89)  BP: 121/65 (03 Sep 2020 05:40) (113/58 - 121/65)  BP(mean): --  RR: 18 (03 Sep 2020 05:40) (18 - 18)  SpO2: 100% (03 Sep 2020 05:40) (99% - 100%)    MEDICATIONS  (STANDING):  acetaminophen   Tablet .. 975 milliGRAM(s) Oral <User Schedule>  diphtheria/tetanus/pertussis (acellular) Vaccine (ADAcel) 0.5 milliLiter(s) IntraMuscular once  ferrous    sulfate 325 milliGRAM(s) Oral daily  heparin   Injectable 5000 Unit(s) SubCutaneous every 12 hours  ibuprofen  Tablet. 600 milliGRAM(s) Oral every 6 hours  prenatal multivitamin 1 Tablet(s) Oral daily      MEDICATIONS  (PRN):  diphenhydrAMINE 25 milliGRAM(s) Oral every 6 hours PRN Itching  lanolin Ointment 1 Application(s) Topical every 6 hours PRN Sore Nipples  magnesium hydroxide Suspension 30 milliLiter(s) Oral two times a day PRN Constipation  oxyCODONE    IR 5 milliGRAM(s) Oral every 3 hours PRN Moderate to Severe Pain (4-10)  oxyCODONE    IR 5 milliGRAM(s) Oral once PRN Moderate to Severe Pain (4-10)  oxyCODONE    IR 5 milliGRAM(s) Oral once PRN Moderate to Severe Pain (4-10)  senna 1 Tablet(s) Oral two times a day PRN Constipation  simethicone 80 milliGRAM(s) Chew every 4 hours PRN Gas      Labs:  Blood type: O Positive  Rubella IgG: Positive (08-29 @ 11:30)  RPR: Negative                          10.3<L>   16.14<H> >-----------< 213    ( 09-03 @ 05:38 )             31.4<L>                        11.0<L>   19.45<H> >-----------< 219    ( 09-02 @ 06:50 )             33.2<L>                        12.8   12.68<H> >-----------< 218    ( 08-31 @ 13:53 )             37.8                  PE:  General: NAD  Heart: RRR  Lungs: CTA  Abdomen: Soft, appropriately tender, incision c/d/i, fundus firm  Extremities: No erythema, no pitting edema

## 2020-09-03 NOTE — PROGRESS NOTE ADULT - ASSESSMENT
A/P: 21yo POD#2 s/p LTCS.  Patient is stable and doing well post-operatively.  Pt was seen by Psychiatry yesterday who cleared her for discharge

## 2020-09-03 NOTE — PROGRESS NOTE ADULT - PROBLEM SELECTOR PLAN 1
- Continue regular diet.  - Increase ambulation.  - Continue motrin, tylenol, oxycodone PRN for pain control.     Agnes Pimentel, PGY-1

## 2020-09-03 NOTE — PROGRESS NOTE ADULT - ATTENDING COMMENTS
MFM Fellow addendum  Seen and evalauted w Dr Knight MFM attg and Dr Peñaloza PGY3  Positive HIV rapid screen, confirmatory testing pending  Initially started on IOL for oligo, discontinued and confirmed pt has *normal* fluid at this time  Awaiting confirmatory results of HIV testing prior to further interventions  If pt goes into labor, ruptures membranes, or otherwise requires delivery, would at this time recommend treating pt as if she is HIV+ without viral load >1000, in absence of other information, in which case AZT and  delivery to reduce risk of vertical transmission would be advised.  Cont inpatient monitoring at this time    MD Kateryna  MFM Fellow
Associate Chief of L & D ( late entry)    OB Progress Note:  Delivery, POD#1    S: 19yo POD#1 s/p LTCS .Patient reports that she feels better today    O:   Vital Signs Last 24 Hrs  T(C): 36.5 (03 Sep 2020 05:40), Max: 36.8 (02 Sep 2020 14:00)  T(F): 97.7 (03 Sep 2020 05:40), Max: 98.2 (02 Sep 2020 14:00)  HR: 74 (03 Sep 2020 05:40) (74 - 83)  BP: 121/65 (03 Sep 2020 05:40) (113/58 - 121/65)  BP(mean): --  RR: 18 (03 Sep 2020 05:40) (18 - 18)  SpO2: 100% (03 Sep 2020 05:40) (100% - 100%)    Labs:  Blood type: O Positive  Rubella IgG: Positive ( @ 11:30)  RPR: Negative                          10.3<L>   16.14<H> >-----------< 213    (  @ 05:38 )             31.4<L>                        11.0<L>   19.45<H> >-----------< 219    (  @ 06:50 )             33.2<L>                        12.8   12.68<H> >-----------< 218    (  @ 13:53 )             37.8          PE:  General: NAD  Abdomen: Mildly distended, appropriately tender  incision c/d/i.  Lochia: scant  Extremities: No erythema, no pitting edema    A/P: 19yo POD#2 s/p  emergent LTCS.   - Continue regular diet.  - Patient is stable for discharge cleared by SW and psychiatry    Polly Prado M.D., M.B.A., M.S.
Patient seen and evaluated  No complains at this time  No contractions  FHR Tracing has been category I  As per previous plan will await confirmatory results of HIV test before resuming induction of labor.  Expect to get results later today.  Agree with above assessment
Associate Chief of L & D ( late entry)     This patient received her care at [facility] and was a scheduled induction there and then chose to come her on  there was some issue with the HIV testing, MFM was also involved and it was reported as negative.   she was delivered emergently by Dr Perdomo for a CAT II tracing.  Upon  receive of the chart it appears that she was admitted for 2 weeks to [facility] for depression    OB Progress Note:  Delivery, POD#1    S: 19yo POD#1 s/p LTCS .    O:   Vital Signs Last 24 Hrs  T(C): 36.9 (02 Sep 2020 10:00), Max: 36.9 (01 Sep 2020 17:50)  T(F): 98.4 (02 Sep 2020 10:00), Max: 98.5 (01 Sep 2020 17:50)  HR: 89 (02 Sep 2020 10:00) (71 - 89)  BP: 119/50 (02 Sep 2020 10:00) (93/80 - 125/48)  BP(mean): 83 (01 Sep 2020 15:00) (48 - 83)  RR: 18 (02 Sep 2020 10:00) (13 - 20)  SpO2: 99% (02 Sep 2020 10:00) (91% - 100%)    Labs:  Blood type: O Positive  Rubella IgG: Positive ( @ 11:30)  RPR: Negative                          11.0<L>   19.45<H> >-----------< 219    (  @ 06:50 )             33.2<L>                        12.8   12.68<H> >-----------< 218    (  @ 13:53 )             37.8      PE:  General: NAD  Abdomen: Mildly distended, appropriately tender  incision c/d/i.  Extremities: No erythema, trace pitting edema    A/P: 19yo POD#1 s/p LTCS due to CAT II tracing  - Continue regular diet.  - Increase ambulation.  - Continue motrin, tylenol, oxycodone PRN for pain control.  vs. continue PCEA for pain.  - F/u AM CBC    Polly Prado M.D., M.B.A., M.S.

## 2020-09-04 ENCOUNTER — TRANSCRIPTION ENCOUNTER (OUTPATIENT)
Age: 20
End: 2020-09-04

## 2020-09-04 DIAGNOSIS — G89.18 OTHER ACUTE POSTPROCEDURAL PAIN: ICD-10-CM

## 2020-09-04 PROBLEM — Z00.00 ENCOUNTER FOR PREVENTIVE HEALTH EXAMINATION: Status: ACTIVE | Noted: 2020-09-04

## 2020-09-04 RX ORDER — ACETAMINOPHEN 500 MG/1
500 TABLET, COATED ORAL
Refills: 0 | Status: ACTIVE | COMMUNITY
Start: 2020-09-04

## 2020-09-14 LAB — SURGICAL PATHOLOGY STUDY: SIGNIFICANT CHANGE UP

## 2020-09-18 ENCOUNTER — APPOINTMENT (OUTPATIENT)
Dept: OBGYN | Facility: HOSPITAL | Age: 20
End: 2020-09-18

## 2020-09-22 ENCOUNTER — APPOINTMENT (OUTPATIENT)
Dept: OBGYN | Facility: HOSPITAL | Age: 20
End: 2020-09-22

## 2020-10-08 ENCOUNTER — APPOINTMENT (OUTPATIENT)
Dept: OBGYN | Facility: HOSPITAL | Age: 20
End: 2020-10-08

## 2020-10-27 ENCOUNTER — APPOINTMENT (OUTPATIENT)
Dept: OBGYN | Facility: HOSPITAL | Age: 20
End: 2020-10-27

## 2021-01-17 ENCOUNTER — EMERGENCY (EMERGENCY)
Facility: HOSPITAL | Age: 21
LOS: 0 days | Discharge: HOME | End: 2021-01-17
Attending: EMERGENCY MEDICINE
Payer: MEDICAID

## 2021-01-17 VITALS
RESPIRATION RATE: 21 BRPM | WEIGHT: 190.04 LBS | DIASTOLIC BLOOD PRESSURE: 76 MMHG | HEART RATE: 95 BPM | HEIGHT: 64 IN | SYSTOLIC BLOOD PRESSURE: 140 MMHG | OXYGEN SATURATION: 98 % | TEMPERATURE: 98 F

## 2021-01-17 DIAGNOSIS — O03.9 COMPLETE OR UNSPECIFIED SPONTANEOUS ABORTION WITHOUT COMPLICATION: Chronic | ICD-10-CM

## 2021-01-17 DIAGNOSIS — Z71.1 PERSON WITH FEARED HEALTH COMPLAINT IN WHOM NO DIAGNOSIS IS MADE: ICD-10-CM

## 2021-01-17 DIAGNOSIS — Z00.8 ENCOUNTER FOR OTHER GENERAL EXAMINATION: ICD-10-CM

## 2021-01-17 PROCEDURE — 99283 EMERGENCY DEPT VISIT LOW MDM: CPT
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
## 2021-01-17 NOTE — ED PROVIDER NOTE - PATIENT PORTAL LINK FT
You can access the FollowMyHealth Patient Portal offered by [facility] by registering at the following website: [email or web address]. By joining [employer]’s FollowMyHealth portal, you will also be able to view your health information using other applications (apps) compatible with our system.

## 2021-01-17 NOTE — ED PROVIDER NOTE - ENMT NEGATIVE STATEMENT, MLM
Detail Level: Detailed Ears: no ear pain and no hearing problems. Nose: no nasal congestion and no nasal drainage. Mouth/Throat: no dysphagia, no hoarseness and no throat pain. Neck: no lumps, no pain, no stiffness and no swollen glands.
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
## 2021-02-16 NOTE — OB RN PATIENT PROFILE - NS PRO TALK SOMEONE YN
[facility] HOSPITALIST PROGRESS NOTE    Subjective    THIS IS A PREP NOTE, THIS PATIENT HAS NOT BEEN SEEN BY THE ROUNDING PHYSICIAN    Objective    Vitals  Vital Signs (last 24 hrs)_____ Last Charted___________Minimum____________ Maximum____________  Temp    L 97.0 (APR 20 08:00) L 96.1 (APR 20 04:50) L 97.2 (APR 19 12:21)  Resp Rate       18  (APR 20 08:00) 15  (APR 19 15:00) H 24 (APR 20 04:50)  SBP    100  (APR 20 08:00) 93  (APR 20 04:50) H 142 (APR 19 15:45)  DBP    77  (APR 20 08:00) L 55 (APR 20 04:50) 77  (APR 20 08:00)    Physical Examination  GENERAL:  Alert, no acute distress  HEENT:  Normocephalic, atraumatic.    NECK: Supple  LUNGS: Demonstrated decreased breath sounds.   CARDIAC: Normal S1, S2, wound VAC has been discontinued mild redness at the site  EXTREMITIES: No edema  ABDOMEN: Active bowel sounds.  Soft, nontender.   EXTREMITIES: There is no edema.     Laboratory Data/Radiology/Microbiology  Labs (Last four charted values)  WBC                  9.2 (APR 20) 9.8 (APR 19) H 13.8 (APR 18) H 11.5 (APR 17)   Hgb                  L 7.5 (APR 20) L 8.1 (APR 19) L 8.8 (APR 18) L 7.8 (APR 17)   Hct                  L 26 (APR 20) L 27 (APR 19) L 29 (APR 18) L 24 (APR 17)   Plt                  167 (APR 20) 260 (APR 19) 321 (APR 18) 210 (APR 17)   Na                   137 (APR 20) 137 (APR 19) 137 (APR 18) L 133 (APR 17)   K                    3.8 (APR 20) 4.0 (APR 19) 4.0 (APR 18) L 3.4 (APR 17)   CO2                  29 (APR 20) 27 (APR 19) 26 (APR 18) 28 (APR 17)   Cl                   101 (APR 20) 101 (APR 19) 99 (APR 18) 98 (APR 17)   Cr                   0.74 (APR 20) 0.70 (APR 19) 0.73 (APR 18) 0.68 (APR 17)   BUN                  12 (APR 20) 13 (APR 19) 11 (APR 18) 12 (APR 17)   Glucose              99 (APR 20) 95 (APR 19) H 101 (APR 18) H 121 (APR 17)   Mg                   2.1 (APR 20) 2.1 (APR 19) 2.3 (APR 18) 2.0 (APR 17)   Ca                   8.6 (APR 20) 8.9 (APR 19) 9.1 (APR 18) 8.6 (APR  17)   PT                   H 24.6 (APR 20) H 16.2 (APR 19) H 14.2 (APR 18) H 16.0 (APR 17)   INR                  H 2.7 (APR 20) H 1.7 (APR 19) H 1.5 (APR 18) H 1.7 (APR 17)   PTT                  H 80 (APR 20) H 53 (APR 19) H 62 (APR 19) H 75 (APR 19)     ASSESSMENT AND PLAN    Severe aortic stenosis s/p mechanical aortic valve replacement on 4/12/2019    Continue pain control as ordered as needed   Continue AC with heparin gtt bridge to Coumadin (INR 1.7)   Chest tubes were removed 4/14, per CV surgery    Cardiology (Dr. Blanca) following, on IV heparin and coumadin, INR goal of 2.5-3.0    CV surgery (Dr. Javier) following, continue with heparin drip until INR therapeutic (2.5-3.0 goal), if insurance denies rehab will send home with Lovenox bridging until INR therapeutic    Paroxysmal atrial fibrilation with RVR   HR rarely tachycardic over past 24 hours on metoprolol and PO amiodarone   Continue AC with heparin gtt bridge to coumadin   Monitoring on telemtry   Cardiology (Dr. Blanca) following, duration of amiodarone treatment depends on outpatient course but can probably be stopped after 3 months if she remains in sinus rhythm     Postoperative hypercapnic-hypoxic respiratory failure 2/2 ventilation perfusion mismatch, atelectasis and fluid overload   Extubated on 4/13, oxygenating on room air   Repeat CXR (4/15) shows mild cardiomegaly and small Rt apical PTX   Continue with diuresis with IV Bumex, discontinued IV Lasix, maintain fluid restriction   Encourage IS and Acapella   Monitoring daily weights and respiratory status closely     Hypotension with h/o HTN   SBP rarely elevated, DBP rarely low over last 24 hours   Continue with metoprolol at decreased dose. Defer further mgmt to cardiology   Monitoring VS closely     Diabetes mellitus type 2   BS remains well controlled    Continue Lantus and adjust as neeeded   Monitor with Accu-cheks, cover with SSI    Chronic iron deficiency anemia  Thrombocytopenia -  resolved   Hgb 8.1 --> 7.5, no e/o active bleeding   Hold ferrous sulfate   Monitoring H&H closely     Leukocytosis and fevers, likely reactive to above   WBC 9.8 --> 9.2    Remains afebrile over the last 24 hours, last reported fever 4/17 (99.7F)   Continue Tylenol prn   Monitoring CBC and VS closely      Chronic back pain with hx/o spinal fusion    Continue with pain control as ordered and per recs of Dr. Baker   Morphine pump to back remains in place.    Pain specialist (Dr. Baker) following; continue with current dose of morphine and percocet    Possible obstructive sleep apnea - Pt may benefit from a formal overnight polysomnogram as outpatient, per Pulm    Hypokalemia-resolved  Hyponatremia-resolved   Acute kidney injury - resolved   Hypomagnesemia - resolved    Hyperlipidemia - continue atorvastatin  Anxiety/depression - Continue with venlafaxine, clonazepam and olanzapine  Arthritis - Continue with pain control as ordered  Migraines - Hold sumatriptan  Obesity with hx/o gastric banding (BMI 37.8) - Dietary and lifestyle modifications recommended    DVT PPx: SCDs, antiembolic stockings, heparin gtt bridge to Coumadin (INR 1.7)  Bowel Regimen: docusate, docusate-senna, bisacodyl prn, Milk of Mag prn    Dispo planning: Pending clinical progression and recommendations from consulting physicians. Timing unclear.  Peer to peer review done for insurance approval to SNF. Still denied.     PCP: NOS    All labs and imaging studies have been reviewed.  All patient questions have been answered.    Charting performed by marcela Panchal for Dr. Halima Osorio    All medical record entries made by the scribe were at my direction. I have reviewed the chart and agree that the record accurately reflects my personal performance of the history, physical exam, hospital course, and assessment and plan.             Electronically Signed On 04.20.2019 12:57  ___________________________________________________   Halima Osorio MD  Johnnie              in error           Electronically Signed On 04.20.2019 17:59  ___________________________________________________   Gloria Davila MD     unable to assess

## 2021-03-17 NOTE — ED ADULT NURSE NOTE - PRIMARY CARE PROVIDER
Left message for patient to return call.  
Patient informed of results/recommendations and verbalized understanding.  Order entered in Epic.  
Recent MILLY's showed the following:  IMPRESSION:    1. Right Ankle: 0.87 - Mild peripheral artery disease.  2. Left Ankle: 0.87 - Mild peripheral artery disease.  3. Toe pressures are within normal limits for adequate wound healing  Potential.    Per Dr. Pablo:  LE arterial US needed.     
Dr MATTA
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
## 2022-01-25 NOTE — ED PROVIDER NOTE - NS HIV RISK FACTOR YES
Declined A-T Advancement Flap Text: The defect edges were debeveled with a #15 scalpel blade.  Given the location of the defect, shape of the defect and the proximity to free margins an A-T advancement flap was deemed most appropriate.  Using a sterile surgical marker, an appropriate advancement flap was drawn incorporating the defect and placing the expected incisions within the relaxed skin tension lines where possible.    The area thus outlined was incised deep to adipose tissue with a #15 scalpel blade.  The skin margins were undermined to an appropriate distance in all directions utilizing iris scissors.

## 2022-02-01 NOTE — ED BEHAVIORAL HEALTH NOTE - BEHAVIORAL HEALTH NOTE
ART attempted to contact multiple hospitals to find inpatient bed for patient.   ART contacted [facility] and was informed there are no beds.   SO reports there are 2 M judah beds only, and must be given to a patient with no aggression or substance use.   ART was informed by [facility] unit that there are no beds.   ART was informed by [facility] unit that there is no psychiatrist on call to review intakes today, and that any potential patients faxed would be reviewed in the morning.   ART was informed by [facility] that the central intake department was closed so patients are not able to be transferred, any bed available can only be given if a patient comes in through their own ED.   ART spoke with psychiatrist on call from [facility] who stated that it would not be possible to take any patients today due to lack of availability and timing, but that cases could be faxed for review for tomorrow.  ART was unable to reach staff at [place].   ART also contacted [facility] and was informed there are no beds. Please inform that the mycoplasma did clear up.      She does have BV again.  I recommend she take metronidazole 1 po bid x 7 days and then I recommend that she use the metrogel 1 chris per vagina twice weekly for 6 months.    I see she has an appointment with Dr. Champagne.  She has a fibroid , and ovarian cyst and pain so she wants to discuss surgical management options.

## 2022-02-25 NOTE — OB RN TRIAGE NOTE - NS_VISITREASON1_OBGYN_ALL_OB
Labor at Term Imiquimod Counseling:  I discussed with the patient the risks of imiquimod including but not limited to erythema, scaling, itching, weeping, crusting, and pain.  Patient understands that the inflammatory response to imiquimod is variable from person to person and was educated regarded proper titration schedule.  If flu-like symptoms develop, patient knows to discontinue the medication and contact us.

## 2022-05-13 NOTE — OB RN INTRAOPERATIVE NOTE - NS_DELIVERYCRNA_OBGYN_ALL_OB_FT
It looks like Dr. Don Chan increase her warfarin to 3.5 mg nightly on 5/9. Please confirm that this is the dose patient is taking. If so, increase to 4 mg nightly and recheck INR after 5 doses.   If not then please increase to 3.5 mg as previously recommended on 5/9 and recheck after 5 doses David

## 2023-02-13 NOTE — ED ADULT TRIAGE NOTE - HEIGHT IN FEET
CURRENT BLOOD PRESSURE IS 91/55 HR 60 EATING DINNER DENIES DISCOMFORTS AT THIS TIME WILL 
CONTINUE TO OBSERVE. 5
